# Patient Record
Sex: FEMALE | Race: WHITE | NOT HISPANIC OR LATINO | URBAN - METROPOLITAN AREA
[De-identification: names, ages, dates, MRNs, and addresses within clinical notes are randomized per-mention and may not be internally consistent; named-entity substitution may affect disease eponyms.]

---

## 2017-10-19 ENCOUNTER — EMERGENCY (EMERGENCY)
Facility: HOSPITAL | Age: 57
LOS: 1 days | Discharge: HOME | End: 2017-10-19
Attending: EMERGENCY MEDICINE | Admitting: EMERGENCY MEDICINE
Payer: COMMERCIAL

## 2017-10-19 VITALS
SYSTOLIC BLOOD PRESSURE: 146 MMHG | OXYGEN SATURATION: 100 % | RESPIRATION RATE: 16 BRPM | TEMPERATURE: 98 F | HEART RATE: 89 BPM | DIASTOLIC BLOOD PRESSURE: 78 MMHG

## 2017-10-19 LAB
ALBUMIN SERPL ELPH-MCNC: 3.8 G/DL — SIGNIFICANT CHANGE UP (ref 3.3–5)
ALP SERPL-CCNC: 92 U/L — SIGNIFICANT CHANGE UP (ref 40–120)
ALT FLD-CCNC: 21 U/L — SIGNIFICANT CHANGE UP (ref 4–33)
AST SERPL-CCNC: 22 U/L — SIGNIFICANT CHANGE UP (ref 4–32)
BILIRUB SERPL-MCNC: 0.6 MG/DL — SIGNIFICANT CHANGE UP (ref 0.2–1.2)
BUN SERPL-MCNC: 26 MG/DL — HIGH (ref 7–23)
CALCIUM SERPL-MCNC: 8.9 MG/DL — SIGNIFICANT CHANGE UP (ref 8.4–10.5)
CHLORIDE SERPL-SCNC: 101 MMOL/L — SIGNIFICANT CHANGE UP (ref 98–107)
CO2 SERPL-SCNC: 24 MMOL/L — SIGNIFICANT CHANGE UP (ref 22–31)
CREAT SERPL-MCNC: 0.88 MG/DL — SIGNIFICANT CHANGE UP (ref 0.5–1.3)
ERYTHROCYTE [SEDIMENTATION RATE] IN BLOOD: 18 MM/HR — SIGNIFICANT CHANGE UP (ref 4–25)
GLUCOSE SERPL-MCNC: 98 MG/DL — SIGNIFICANT CHANGE UP (ref 70–99)
HCT VFR BLD CALC: 32 % — LOW (ref 34.5–45)
HGB BLD-MCNC: 9.4 G/DL — LOW (ref 11.5–15.5)
MCHC RBC-ENTMCNC: 24.7 PG — LOW (ref 27–34)
MCHC RBC-ENTMCNC: 29.4 % — LOW (ref 32–36)
MCV RBC AUTO: 84.2 FL — SIGNIFICANT CHANGE UP (ref 80–100)
NRBC # FLD: 0 — SIGNIFICANT CHANGE UP
PLATELET # BLD AUTO: 203 K/UL — SIGNIFICANT CHANGE UP (ref 150–400)
PMV BLD: 10.1 FL — SIGNIFICANT CHANGE UP (ref 7–13)
POTASSIUM SERPL-MCNC: 4.4 MMOL/L — SIGNIFICANT CHANGE UP (ref 3.5–5.3)
POTASSIUM SERPL-SCNC: 4.4 MMOL/L — SIGNIFICANT CHANGE UP (ref 3.5–5.3)
PROT SERPL-MCNC: 6.8 G/DL — SIGNIFICANT CHANGE UP (ref 6–8.3)
RBC # BLD: 3.8 M/UL — SIGNIFICANT CHANGE UP (ref 3.8–5.2)
RBC # FLD: 17.2 % — HIGH (ref 10.3–14.5)
SODIUM SERPL-SCNC: 137 MMOL/L — SIGNIFICANT CHANGE UP (ref 135–145)
WBC # BLD: 7.11 K/UL — SIGNIFICANT CHANGE UP (ref 3.8–10.5)
WBC # FLD AUTO: 7.11 K/UL — SIGNIFICANT CHANGE UP (ref 3.8–10.5)

## 2017-10-19 PROCEDURE — 73600 X-RAY EXAM OF ANKLE: CPT | Mod: 26,76,RT

## 2017-10-19 PROCEDURE — 99284 EMERGENCY DEPT VISIT MOD MDM: CPT

## 2017-10-19 NOTE — ED PROVIDER NOTE - CARE PLAN
Principal Discharge DX:	Venous stasis ulcer  Instructions for follow-up, activity and diet:	The patient was given verbal and written discharge instructions. Specifically, instructions when to return to the ED and when to seek follow-up from their pcp was discussed. Any specialty follow-up was discussed, including how to make an appointment.  Instructions were discussed in simple, plain language and was understood by the patient. The patient understands that should their symptoms worsen or any new symptoms arise, they should return to the ED immediately for further evaluation.

## 2017-10-19 NOTE — CONSULT NOTE ADULT - SUBJECTIVE AND OBJECTIVE BOX
HPI: 57 yo obese woman with PMH of afib on xarelo, presenting with bilateral ankle venous stasis wounds with bleeding today. The patient reports that she was standing at work when she noticed a pool of blood that had soaked through her compression stockings. She attempted to control the bleeding with multiple bandages, but she rebleed as soon as she stood up again.     She states that she has had these wounds for at least six months. She has worn compression stockings regularly since approximately 2010, when she was immobilized for a prolonged period after breaking her foot. She was evaluated "years ago" for venous thrombi, but was determined to not have any VTE. She reports that she follows with a podiatrist, but does not see a vascular surgeon as an outpatient. She has been using topical clindamycin and erythromycin on these wounds.     PMHx: Afib on xarelto, Prior TIA, approximately 5 years ago.     PSHx: See above      Allergies: No Known Allergies  (Intolerances: aspirin (Other (Mild to Mod))  )  Social Hx: Works as a .     Physical Exam  T(C): 36.4 (10-19-17 @ 14:35)  HR: 89 (10-19-17 @ 14:35) (89 - 89)  BP: 146/78 (10-19-17 @ 14:35) (146/78 - 146/78)  RR: 16 (10-19-17 @ 14:35) (16 - 16)  SpO2: 100% (10-19-17 @ 14:35) (100% - 100%)  Tmax: T(C): , Max: 36.4 (10-19-17 @ 14:35)    General: pleasant, obese woman in NAD, AOx3.   Ext: Bilateral deep venous stasis wounds on LEFT medial ankle and RIGHT lateral ankle. Tender erythma around wounds. Wounds hemostatic. Bilateral varicose veins and chronic skin changes. RIGHT DP/ PT. LEFT DP palp.     Labs:                        9.4    7.11  )-----------( 203      ( 19 Oct 2017 15:28 )             32.0       10-19    137  |  101  |  26<H>  ----------------------------<  98  4.4   |  24  |  0.88    Ca    8.9      19 Oct 2017 15:28    TPro  6.8  /  Alb  3.8  /  TBili  0.6  /  DBili  x   /  AST  22  /  ALT  21  /  AlkPhos  92  10-19

## 2017-10-19 NOTE — ED ADULT NURSE NOTE - OBJECTIVE STATEMENT
pt received to intake #9 with c/o bleeding from ulcers. pt with chronic PVD ulcers to b/l ankles. states she takes Xarelto for treatment of a-fib. denies cp, sob, n/v/d. IV placed, labs drawn and sent. no new orders at this time. will cont to monitor.

## 2017-10-19 NOTE — ED PROVIDER NOTE - PSH
Ankle fracture, left  s/p Surgery--2009--[facility]  Foot fracture, left  s/p Surgery --2009--[facility]

## 2017-10-19 NOTE — ED ADULT TRIAGE NOTE - CHIEF COMPLAINT QUOTE
Pt c/o bilateral lower extremity bleeding ulcers x 2 hours.  denies any fevers, chills, nausea, vomiting. pain. on gentamycin, zithromax, and cipro. ulcers wrapped by pt.

## 2017-10-19 NOTE — ED PROVIDER NOTE - PMH
HLD (hyperlipidemia)  Not on meds  HTN - Hypertension  borderline, not on meds  PUD (peptic ulcer disease)  not on PPI

## 2017-10-19 NOTE — ED PROVIDER NOTE - OBJECTIVE STATEMENT
56yF hx afib (on xarelto), htn, bl venous stasis ulcers p/w bleeding from ulcers today. pt follows with Podiatry. Started a silver cream recently. Today at work ulcers started bleeding and formed puddles of blood on ground. Pt attempted to control bleeding with multiple acewraps and bandages. Presented to ED 2/2 inability to control bleeding. No increase in pain. No fever.

## 2017-10-19 NOTE — ED PROVIDER NOTE - ATTENDING CONTRIBUTION TO CARE
DR Bloch- Patient examined hx taken, bleeding from bilateral CVS ulcers on ankles.  Morbidly obese female, Hx of afib on xarelto, has been treated with antibiotics and wounds care but getting worse. Bleeding under control here, right ankle with  stage 3 ulcer on post lateral ankle, Pulses distal present. left leg with 2x2 cm ulcer on medial are above med maleolus, with yellow exudate.  Lungs clear. abd soft nontender.

## 2017-10-19 NOTE — CONSULT NOTE ADULT - ASSESSMENT
55 yo woman with chronic venous ulcers 2/2 to chronic VTE or venous insufficiency.    - Hemorrhage from superficial veins, now controlled  - No evidence of ulcer suprainfection   - Compression stockings, ACE bandage, elevation   - Bilateral lower extremity ultrasound to evaluate for venous insufficiency, saphenofemoral reflux and VTE  - May use collagenase for chemical debridement of wound base.   - Outpatient follow up with Dr. Chang (Office tel: [phone])  - Patient seen and evaluated with Vascular Fellow, Dr. Xiao   - Discussed with Dr. Charlene Carvajal MD PGY2  Vascular surgery: [id number]

## 2017-10-19 NOTE — ED PROVIDER NOTE - PROGRESS NOTE DETAILS
Corin: vascular saw pt. They recommend followup with Dr. Chang at [address] [place] [address]. She can call [phone] to make an appointment.

## 2017-11-01 ENCOUNTER — APPOINTMENT (OUTPATIENT)
Dept: VASCULAR SURGERY | Facility: CLINIC | Age: 57
End: 2017-11-01
Payer: COMMERCIAL

## 2017-11-01 VITALS
SYSTOLIC BLOOD PRESSURE: 137 MMHG | HEIGHT: 70 IN | HEART RATE: 97 BPM | BODY MASS INDEX: 41.95 KG/M2 | WEIGHT: 293 LBS | DIASTOLIC BLOOD PRESSURE: 85 MMHG | TEMPERATURE: 98.2 F

## 2017-11-01 DIAGNOSIS — Z87.19 PERSONAL HISTORY OF OTHER DISEASES OF THE DIGESTIVE SYSTEM: ICD-10-CM

## 2017-11-01 DIAGNOSIS — Z86.73 PERSONAL HISTORY OF TRANSIENT ISCHEMIC ATTACK (TIA), AND CEREBRAL INFARCTION W/OUT RESIDUAL DEFICITS: ICD-10-CM

## 2017-11-01 DIAGNOSIS — Z86.79 PERSONAL HISTORY OF OTHER DISEASES OF THE CIRCULATORY SYSTEM: ICD-10-CM

## 2017-11-01 DIAGNOSIS — R20.8 OTHER DISTURBANCES OF SKIN SENSATION: ICD-10-CM

## 2017-11-01 PROCEDURE — 93970 EXTREMITY STUDY: CPT

## 2017-11-01 PROCEDURE — 99204 OFFICE O/P NEW MOD 45 MIN: CPT | Mod: 25

## 2017-11-01 PROCEDURE — 29580 STRAPPING UNNA BOOT: CPT | Mod: 50

## 2017-11-01 RX ORDER — RIVAROXABAN 2.5 MG/1
TABLET, FILM COATED ORAL
Refills: 0 | Status: ACTIVE | COMMUNITY

## 2017-11-08 ENCOUNTER — APPOINTMENT (OUTPATIENT)
Dept: VASCULAR SURGERY | Facility: CLINIC | Age: 57
End: 2017-11-08
Payer: COMMERCIAL

## 2017-11-08 VITALS
TEMPERATURE: 98 F | WEIGHT: 293 LBS | DIASTOLIC BLOOD PRESSURE: 88 MMHG | BODY MASS INDEX: 41.95 KG/M2 | HEART RATE: 93 BPM | SYSTOLIC BLOOD PRESSURE: 136 MMHG | HEIGHT: 70 IN

## 2017-11-08 PROCEDURE — 29580 STRAPPING UNNA BOOT: CPT | Mod: 50

## 2017-11-08 PROCEDURE — 99213 OFFICE O/P EST LOW 20 MIN: CPT | Mod: 25

## 2017-11-10 ENCOUNTER — APPOINTMENT (OUTPATIENT)
Dept: VASCULAR SURGERY | Facility: CLINIC | Age: 57
End: 2017-11-10
Payer: COMMERCIAL

## 2017-11-10 PROCEDURE — 99213 OFFICE O/P EST LOW 20 MIN: CPT

## 2017-11-13 ENCOUNTER — APPOINTMENT (OUTPATIENT)
Dept: VASCULAR SURGERY | Facility: CLINIC | Age: 57
End: 2017-11-13
Payer: COMMERCIAL

## 2017-11-13 VITALS
DIASTOLIC BLOOD PRESSURE: 81 MMHG | HEIGHT: 70 IN | SYSTOLIC BLOOD PRESSURE: 136 MMHG | TEMPERATURE: 98 F | HEART RATE: 105 BPM | WEIGHT: 293 LBS | BODY MASS INDEX: 41.95 KG/M2

## 2017-11-13 PROCEDURE — 29580 STRAPPING UNNA BOOT: CPT | Mod: LT

## 2017-11-15 ENCOUNTER — APPOINTMENT (OUTPATIENT)
Dept: VASCULAR SURGERY | Facility: CLINIC | Age: 57
End: 2017-11-15
Payer: COMMERCIAL

## 2017-11-15 VITALS
DIASTOLIC BLOOD PRESSURE: 82 MMHG | SYSTOLIC BLOOD PRESSURE: 135 MMHG | BODY MASS INDEX: 41.95 KG/M2 | WEIGHT: 293 LBS | HEIGHT: 70 IN | TEMPERATURE: 97.4 F | HEART RATE: 103 BPM

## 2017-11-15 PROCEDURE — 99213 OFFICE O/P EST LOW 20 MIN: CPT

## 2017-11-17 ENCOUNTER — APPOINTMENT (OUTPATIENT)
Dept: VASCULAR SURGERY | Facility: CLINIC | Age: 57
End: 2017-11-17
Payer: COMMERCIAL

## 2017-11-17 VITALS
BODY MASS INDEX: 41.95 KG/M2 | DIASTOLIC BLOOD PRESSURE: 85 MMHG | HEIGHT: 70 IN | SYSTOLIC BLOOD PRESSURE: 123 MMHG | WEIGHT: 293 LBS | HEART RATE: 99 BPM | TEMPERATURE: 97.5 F

## 2017-11-17 PROCEDURE — 29580 STRAPPING UNNA BOOT: CPT | Mod: LT

## 2017-11-17 RX ORDER — METOPROLOL SUCCINATE 100 MG/1
100 TABLET, EXTENDED RELEASE ORAL
Qty: 90 | Refills: 0 | Status: ACTIVE | COMMUNITY
Start: 2017-11-15

## 2017-11-17 RX ORDER — ATORVASTATIN CALCIUM 40 MG/1
40 TABLET, FILM COATED ORAL
Qty: 90 | Refills: 0 | Status: ACTIVE | COMMUNITY
Start: 2017-11-15

## 2017-11-20 ENCOUNTER — APPOINTMENT (OUTPATIENT)
Dept: VASCULAR SURGERY | Facility: CLINIC | Age: 57
End: 2017-11-20
Payer: COMMERCIAL

## 2017-11-20 VITALS
TEMPERATURE: 98.2 F | DIASTOLIC BLOOD PRESSURE: 94 MMHG | WEIGHT: 293 LBS | HEIGHT: 70 IN | RESPIRATION RATE: 12 BRPM | SYSTOLIC BLOOD PRESSURE: 140 MMHG | BODY MASS INDEX: 41.95 KG/M2 | HEART RATE: 84 BPM

## 2017-11-20 PROCEDURE — 29580 STRAPPING UNNA BOOT: CPT | Mod: LT

## 2017-11-22 ENCOUNTER — APPOINTMENT (OUTPATIENT)
Dept: VASCULAR SURGERY | Facility: CLINIC | Age: 57
End: 2017-11-22
Payer: COMMERCIAL

## 2017-11-22 VITALS
HEART RATE: 98 BPM | SYSTOLIC BLOOD PRESSURE: 121 MMHG | DIASTOLIC BLOOD PRESSURE: 75 MMHG | WEIGHT: 293 LBS | HEIGHT: 70 IN | BODY MASS INDEX: 41.95 KG/M2 | TEMPERATURE: 97.7 F

## 2017-11-22 PROCEDURE — 99212 OFFICE O/P EST SF 10 MIN: CPT

## 2017-11-27 ENCOUNTER — APPOINTMENT (OUTPATIENT)
Dept: VASCULAR SURGERY | Facility: CLINIC | Age: 57
End: 2017-11-27
Payer: COMMERCIAL

## 2017-11-27 VITALS
SYSTOLIC BLOOD PRESSURE: 126 MMHG | TEMPERATURE: 98.3 F | BODY MASS INDEX: 41.95 KG/M2 | HEIGHT: 70 IN | HEART RATE: 96 BPM | DIASTOLIC BLOOD PRESSURE: 90 MMHG | WEIGHT: 293 LBS

## 2017-11-27 PROCEDURE — 29580 STRAPPING UNNA BOOT: CPT | Mod: LT

## 2017-11-29 ENCOUNTER — APPOINTMENT (OUTPATIENT)
Dept: VASCULAR SURGERY | Facility: CLINIC | Age: 57
End: 2017-11-29

## 2017-11-30 ENCOUNTER — APPOINTMENT (OUTPATIENT)
Dept: VASCULAR SURGERY | Facility: CLINIC | Age: 57
End: 2017-11-30
Payer: COMMERCIAL

## 2017-11-30 VITALS
SYSTOLIC BLOOD PRESSURE: 124 MMHG | HEART RATE: 77 BPM | TEMPERATURE: 97.6 F | DIASTOLIC BLOOD PRESSURE: 83 MMHG | BODY MASS INDEX: 41.95 KG/M2 | WEIGHT: 293 LBS | HEIGHT: 70 IN

## 2017-11-30 DIAGNOSIS — I83.819 VARICOSE VEINS OF UNSPECIFIED LOWER EXTREMITY WITH PAIN: ICD-10-CM

## 2017-11-30 PROCEDURE — 29580 STRAPPING UNNA BOOT: CPT | Mod: 50

## 2017-12-04 ENCOUNTER — APPOINTMENT (OUTPATIENT)
Dept: VASCULAR SURGERY | Facility: CLINIC | Age: 57
End: 2017-12-04
Payer: COMMERCIAL

## 2017-12-04 VITALS
SYSTOLIC BLOOD PRESSURE: 116 MMHG | TEMPERATURE: 98 F | DIASTOLIC BLOOD PRESSURE: 74 MMHG | HEART RATE: 97 BPM | HEIGHT: 70 IN | WEIGHT: 293 LBS | BODY MASS INDEX: 41.95 KG/M2

## 2017-12-04 PROCEDURE — 29580 STRAPPING UNNA BOOT: CPT | Mod: 50

## 2017-12-08 ENCOUNTER — APPOINTMENT (OUTPATIENT)
Dept: VASCULAR SURGERY | Facility: CLINIC | Age: 57
End: 2017-12-08
Payer: COMMERCIAL

## 2017-12-08 VITALS
BODY MASS INDEX: 41.95 KG/M2 | SYSTOLIC BLOOD PRESSURE: 138 MMHG | HEIGHT: 70 IN | TEMPERATURE: 98.1 F | WEIGHT: 293 LBS | DIASTOLIC BLOOD PRESSURE: 84 MMHG | HEART RATE: 89 BPM

## 2017-12-08 PROCEDURE — 29580 STRAPPING UNNA BOOT: CPT | Mod: 50

## 2017-12-11 ENCOUNTER — APPOINTMENT (OUTPATIENT)
Dept: VASCULAR SURGERY | Facility: CLINIC | Age: 57
End: 2017-12-11
Payer: COMMERCIAL

## 2017-12-11 VITALS
DIASTOLIC BLOOD PRESSURE: 87 MMHG | BODY MASS INDEX: 41.95 KG/M2 | HEART RATE: 86 BPM | TEMPERATURE: 97.5 F | SYSTOLIC BLOOD PRESSURE: 131 MMHG | WEIGHT: 293 LBS | HEIGHT: 70 IN

## 2017-12-11 PROCEDURE — 99214 OFFICE O/P EST MOD 30 MIN: CPT | Mod: 25

## 2017-12-11 PROCEDURE — 29580 STRAPPING UNNA BOOT: CPT | Mod: 50

## 2017-12-14 ENCOUNTER — APPOINTMENT (OUTPATIENT)
Dept: VASCULAR SURGERY | Facility: CLINIC | Age: 57
End: 2017-12-14
Payer: COMMERCIAL

## 2017-12-14 VITALS
DIASTOLIC BLOOD PRESSURE: 84 MMHG | HEART RATE: 93 BPM | SYSTOLIC BLOOD PRESSURE: 149 MMHG | HEIGHT: 70 IN | BODY MASS INDEX: 41.95 KG/M2 | WEIGHT: 293 LBS | TEMPERATURE: 97.7 F

## 2017-12-14 PROCEDURE — 29580 STRAPPING UNNA BOOT: CPT | Mod: 50

## 2017-12-18 ENCOUNTER — APPOINTMENT (OUTPATIENT)
Dept: VASCULAR SURGERY | Facility: CLINIC | Age: 57
End: 2017-12-18
Payer: COMMERCIAL

## 2017-12-18 VITALS
DIASTOLIC BLOOD PRESSURE: 79 MMHG | TEMPERATURE: 97.8 F | HEART RATE: 102 BPM | SYSTOLIC BLOOD PRESSURE: 126 MMHG | HEIGHT: 70 IN | BODY MASS INDEX: 41.95 KG/M2 | WEIGHT: 293 LBS

## 2017-12-18 PROCEDURE — 29580 STRAPPING UNNA BOOT: CPT | Mod: 50

## 2017-12-21 ENCOUNTER — APPOINTMENT (OUTPATIENT)
Dept: VASCULAR SURGERY | Facility: CLINIC | Age: 57
End: 2017-12-21
Payer: COMMERCIAL

## 2017-12-21 VITALS
WEIGHT: 293 LBS | SYSTOLIC BLOOD PRESSURE: 137 MMHG | TEMPERATURE: 97.6 F | HEART RATE: 99 BPM | BODY MASS INDEX: 41.95 KG/M2 | DIASTOLIC BLOOD PRESSURE: 79 MMHG | HEIGHT: 70 IN

## 2017-12-21 PROCEDURE — 29580 STRAPPING UNNA BOOT: CPT | Mod: 50

## 2017-12-29 ENCOUNTER — APPOINTMENT (OUTPATIENT)
Dept: VASCULAR SURGERY | Facility: CLINIC | Age: 57
End: 2017-12-29
Payer: COMMERCIAL

## 2017-12-29 PROCEDURE — 36475 ENDOVENOUS RF 1ST VEIN: CPT | Mod: RT

## 2018-01-02 ENCOUNTER — APPOINTMENT (OUTPATIENT)
Dept: VASCULAR SURGERY | Facility: CLINIC | Age: 58
End: 2018-01-02
Payer: COMMERCIAL

## 2018-01-02 PROCEDURE — 93971 EXTREMITY STUDY: CPT

## 2018-01-09 ENCOUNTER — MEDICATION RENEWAL (OUTPATIENT)
Age: 58
End: 2018-01-09

## 2018-01-12 ENCOUNTER — APPOINTMENT (OUTPATIENT)
Dept: VASCULAR SURGERY | Facility: CLINIC | Age: 58
End: 2018-01-12
Payer: COMMERCIAL

## 2018-01-12 VITALS
TEMPERATURE: 98 F | HEART RATE: 91 BPM | DIASTOLIC BLOOD PRESSURE: 73 MMHG | BODY MASS INDEX: 41.95 KG/M2 | WEIGHT: 293 LBS | SYSTOLIC BLOOD PRESSURE: 116 MMHG | HEIGHT: 70 IN

## 2018-01-12 DIAGNOSIS — I83.899 VARICOSE VEINS OF UNSPECIFIED LOWER EXTREMITY WITH OTHER COMPLICATIONS: ICD-10-CM

## 2018-01-12 PROCEDURE — 99213 OFFICE O/P EST LOW 20 MIN: CPT

## 2018-01-12 RX ORDER — AMOXICILLIN AND CLAVULANATE POTASSIUM 875; 125 MG/1; MG/1
875-125 TABLET, COATED ORAL
Qty: 20 | Refills: 0 | Status: DISCONTINUED | COMMUNITY
Start: 2017-07-11 | End: 2018-01-12

## 2018-01-19 ENCOUNTER — APPOINTMENT (OUTPATIENT)
Dept: VASCULAR SURGERY | Facility: CLINIC | Age: 58
End: 2018-01-19
Payer: COMMERCIAL

## 2018-01-19 PROCEDURE — 36475 ENDOVENOUS RF 1ST VEIN: CPT | Mod: LT

## 2018-01-22 ENCOUNTER — APPOINTMENT (OUTPATIENT)
Dept: VASCULAR SURGERY | Facility: CLINIC | Age: 58
End: 2018-01-22
Payer: COMMERCIAL

## 2018-01-22 PROCEDURE — 93971 EXTREMITY STUDY: CPT

## 2018-02-07 ENCOUNTER — RX RENEWAL (OUTPATIENT)
Age: 58
End: 2018-02-07

## 2018-02-16 ENCOUNTER — APPOINTMENT (OUTPATIENT)
Dept: VASCULAR SURGERY | Facility: CLINIC | Age: 58
End: 2018-02-16
Payer: COMMERCIAL

## 2018-02-16 PROCEDURE — 36475 ENDOVENOUS RF 1ST VEIN: CPT | Mod: RT

## 2018-02-20 ENCOUNTER — APPOINTMENT (OUTPATIENT)
Dept: VASCULAR SURGERY | Facility: CLINIC | Age: 58
End: 2018-02-20
Payer: COMMERCIAL

## 2018-02-20 PROCEDURE — 93971 EXTREMITY STUDY: CPT

## 2018-03-28 ENCOUNTER — APPOINTMENT (OUTPATIENT)
Dept: VASCULAR SURGERY | Facility: CLINIC | Age: 58
End: 2018-03-28

## 2019-11-04 ENCOUNTER — OUTPATIENT (OUTPATIENT)
Dept: OUTPATIENT SERVICES | Facility: HOSPITAL | Age: 59
LOS: 1 days | Discharge: HOME | End: 2019-11-04
Payer: COMMERCIAL

## 2019-11-04 VITALS
SYSTOLIC BLOOD PRESSURE: 126 MMHG | HEART RATE: 73 BPM | WEIGHT: 293 LBS | OXYGEN SATURATION: 97 % | HEIGHT: 70 IN | DIASTOLIC BLOOD PRESSURE: 96 MMHG | RESPIRATION RATE: 17 BRPM | TEMPERATURE: 97 F

## 2019-11-04 DIAGNOSIS — M67.472 GANGLION, LEFT ANKLE AND FOOT: ICD-10-CM

## 2019-11-04 DIAGNOSIS — T84.84XA PAIN DUE TO INTERNAL ORTHOPEDIC PROSTHETIC DEVICES, IMPLANTS AND GRAFTS, INITIAL ENCOUNTER: ICD-10-CM

## 2019-11-04 DIAGNOSIS — E66.9 OBESITY, UNSPECIFIED: ICD-10-CM

## 2019-11-04 DIAGNOSIS — I48.91 UNSPECIFIED ATRIAL FIBRILLATION: ICD-10-CM

## 2019-11-04 DIAGNOSIS — Z01.818 ENCOUNTER FOR OTHER PREPROCEDURAL EXAMINATION: ICD-10-CM

## 2019-11-04 DIAGNOSIS — I10 ESSENTIAL (PRIMARY) HYPERTENSION: ICD-10-CM

## 2019-11-04 DIAGNOSIS — E78.5 HYPERLIPIDEMIA, UNSPECIFIED: ICD-10-CM

## 2019-11-04 LAB
ANION GAP SERPL CALC-SCNC: 6 MMOL/L — SIGNIFICANT CHANGE UP (ref 5–17)
APTT BLD: 44.6 SEC — HIGH (ref 28.5–37)
BASOPHILS # BLD AUTO: 0.03 K/UL — SIGNIFICANT CHANGE UP (ref 0–0.2)
BASOPHILS NFR BLD AUTO: 0.6 % — SIGNIFICANT CHANGE UP (ref 0–2)
BUN SERPL-MCNC: 28 MG/DL — HIGH (ref 7–23)
CALCIUM SERPL-MCNC: 9.3 MG/DL — SIGNIFICANT CHANGE UP (ref 8.5–10.1)
CHLORIDE SERPL-SCNC: 109 MMOL/L — HIGH (ref 96–108)
CO2 SERPL-SCNC: 29 MMOL/L — SIGNIFICANT CHANGE UP (ref 22–31)
CREAT SERPL-MCNC: 0.78 MG/DL — SIGNIFICANT CHANGE UP (ref 0.5–1.3)
EOSINOPHIL # BLD AUTO: 0.11 K/UL — SIGNIFICANT CHANGE UP (ref 0–0.5)
EOSINOPHIL NFR BLD AUTO: 2.2 % — SIGNIFICANT CHANGE UP (ref 0–6)
GLUCOSE SERPL-MCNC: 94 MG/DL — SIGNIFICANT CHANGE UP (ref 70–99)
HCT VFR BLD CALC: 39.8 % — SIGNIFICANT CHANGE UP (ref 34.5–45)
HGB BLD-MCNC: 12.8 G/DL — SIGNIFICANT CHANGE UP (ref 11.5–15.5)
IMM GRANULOCYTES NFR BLD AUTO: 0.6 % — SIGNIFICANT CHANGE UP (ref 0–1.5)
INR BLD: 1.85 RATIO — HIGH (ref 0.88–1.16)
LYMPHOCYTES # BLD AUTO: 1 K/UL — SIGNIFICANT CHANGE UP (ref 1–3.3)
LYMPHOCYTES # BLD AUTO: 20 % — SIGNIFICANT CHANGE UP (ref 13–44)
MCHC RBC-ENTMCNC: 30.5 PG — SIGNIFICANT CHANGE UP (ref 27–34)
MCHC RBC-ENTMCNC: 32.2 GM/DL — SIGNIFICANT CHANGE UP (ref 32–36)
MCV RBC AUTO: 94.8 FL — SIGNIFICANT CHANGE UP (ref 80–100)
MONOCYTES # BLD AUTO: 0.37 K/UL — SIGNIFICANT CHANGE UP (ref 0–0.9)
MONOCYTES NFR BLD AUTO: 7.4 % — SIGNIFICANT CHANGE UP (ref 2–14)
NEUTROPHILS # BLD AUTO: 3.47 K/UL — SIGNIFICANT CHANGE UP (ref 1.8–7.4)
NEUTROPHILS NFR BLD AUTO: 69.2 % — SIGNIFICANT CHANGE UP (ref 43–77)
NRBC # BLD: 0 /100 WBCS — SIGNIFICANT CHANGE UP (ref 0–0)
PLATELET # BLD AUTO: 170 K/UL — SIGNIFICANT CHANGE UP (ref 150–400)
POTASSIUM SERPL-MCNC: 4.5 MMOL/L — SIGNIFICANT CHANGE UP (ref 3.5–5.3)
POTASSIUM SERPL-SCNC: 4.5 MMOL/L — SIGNIFICANT CHANGE UP (ref 3.5–5.3)
PROTHROM AB SERPL-ACNC: 21.1 SEC — HIGH (ref 10–12.9)
RBC # BLD: 4.2 M/UL — SIGNIFICANT CHANGE UP (ref 3.8–5.2)
RBC # FLD: 12.6 % — SIGNIFICANT CHANGE UP (ref 10.3–14.5)
SODIUM SERPL-SCNC: 144 MMOL/L — SIGNIFICANT CHANGE UP (ref 135–145)
WBC # BLD: 5.01 K/UL — SIGNIFICANT CHANGE UP (ref 3.8–10.5)
WBC # FLD AUTO: 5.01 K/UL — SIGNIFICANT CHANGE UP (ref 3.8–10.5)

## 2019-11-04 PROCEDURE — 93010 ELECTROCARDIOGRAM REPORT: CPT

## 2019-11-04 NOTE — H&P PST ADULT - ASSESSMENT
59F pmh afib (on Xarelto), htn, hl, anemia, obesity, TIA c/o left foot discomfort 2/2 internal orthopedic prosthetic devices and ganglion here for PST for scheduled Left foot excision of hardware and excision of cyst  CAPRINI SCORE    AGE RELATED RISK FACTORS                                                       MOBILITY RELATED FACTORS  [ x Age 41-60 years                                            (1 Point)                  [ ] Bed rest                                                        (1 Point)  [ ] Age: 61-74 years                                           (2 Points)                [ ] Plaster cast                                                   (2 Points)  [ ] Age= 75 years                                              (3 Points)                 [ ] Bed bound for more than 72 hours                   (2 Points)    DISEASE RELATED RISK FACTORS                                               GENDER SPECIFIC FACTORS  [x ] Edema in the lower extremities                       (1 Point)                  [ ] Pregnancy                                                     (1 Point)  [ ] Varicose veins                                               (1 Point)                  [ ] Post-partum < 6 weeks                                   (1 Point)             [x ] BMI > 25 Kg/m2                                            (1 Point)                  [ ] Hormonal therapy  or oral contraception            (1 Point)                 [ ] Sepsis (in the previous month)                        (1 Point)                  [ ] History of pregnancy complications  [ ] Pneumonia or serious lung disease                                               [ ] Unexplained or recurrent                       (1 Point)           (in the previous month)                               (1 Point)  [ ] Abnormal pulmonary function test                     (1 Point)                 SURGERY RELATED RISK FACTORS  [ ] Acute myocardial infarction                              (1 Point)                 [ ]  Section                                            (1 Point)  [ ] Congestive heart failure (in the previous month)  (1 Point)                 [ ] Minor surgery                                                 (1 Point)   [ ] Inflammatory bowel disease                             (1 Point)                 [ ] Arthroscopic surgery                                        (2 Points)  [ ] Central venous access                                    (2 Points)                [ x] General surgery lasting more than 45 minutes   (2 Points)       [ ] Stroke (in the previous month)                          (5 Points)               [ ] Elective arthroplasty                                        (5 Points)                                                                                                                                               HEMATOLOGY RELATED FACTORS                                                 TRAUMA RELATED RISK FACTORS  [ ] Prior episodes of VTE                                     (3 Points)                 [ ] Fracture of the hip, pelvis, or leg                       (5 Points)  [ ] Positive family history for VTE                         (3 Points)                 [ ] Acute spinal cord injury (in the previous month)  (5 Points)  [ ] Prothrombin 66817 A                                      (3 Points)                 [ ] Paralysis  (less than 1 month)                          (5 Points)  [ ] Factor V Leiden                                             (3 Points)                 [ ] Multiple Trauma within 1 month                         (5 Points)  [ ] Lupus anticoagulants                                     (3 Points)                                                           [ ] Anticardiolipin antibodies                                (3 Points)                                                       [ ] High homocysteine in the blood                      (3 Points)                                             [ ] Other congenital or acquired thrombophilia       (3 Points)                                                [ ] Heparin induced thrombocytopenia                  (3 Points)                                          Total Score [      5    ]

## 2019-11-04 NOTE — H&P PST ADULT - HISTORY OF PRESENT ILLNESS
59F pmh afib (on Xarelto), htn, hl, anemia, obesity, TIA c/o left foot discomfort 2/2 internal orthopedic prosthetic devices and ganglion here for PST for scheduled Left foot excision of hardware and excision of cyst

## 2019-11-04 NOTE — H&P PST ADULT - NSICDXPASTMEDICALHX_GEN_ALL_CORE_FT
PAST MEDICAL HISTORY:  Afib On Xarelto    HLD (hyperlipidemia) Not on meds    HTN - Hypertension borderline, not on meds    PUD (peptic ulcer disease) not on PPI PAST MEDICAL HISTORY:  Afib On Xarelto    HLD (hyperlipidemia) Not on meds    HTN - Hypertension borderline, not on meds    Obesity     PUD (peptic ulcer disease) not on PPI    TIA (transient ischemic attack)

## 2019-11-04 NOTE — H&P PST ADULT - NSICDXPASTSURGICALHX_GEN_ALL_CORE_FT
PAST SURGICAL HISTORY:  Ankle fracture, left s/p Surgery--2009--[email or web address]    Foot fracture, left s/p Surgery --2009--[email or web address] PAST SURGICAL HISTORY:  Ankle fracture, left s/p Surgery--2009--[facility]    Foot fracture, left s/p Surgery --2009--[facility]

## 2019-11-04 NOTE — H&P PST ADULT - NSICDXPROBLEM_GEN_ALL_CORE_FT
PROBLEM DIAGNOSES  Problem: Pain due to internal orthopedic prosthetic devices, implants and grafts, initial encounter  Assessment and Plan: Left foot excision of hardware and excision of cyst  Pre-op instructions given  patient verbalized understanding  Chlorhexidine wash instructions given   Pending: Cardiac clearance    Problem: Ganglion, left ankle and foot  Assessment and Plan: Left foot excisionof fyipdy6gm and excision of cyst    Problem: Afib  Assessment and Plan: On Xarelto  Continue current regimen and medications.   Pending: Cardiac clearance    Problem: HLD (hyperlipidemia)  Assessment and Plan: Continue current regimen and medications.     Problem: HTN (hypertension)  Assessment and Plan: Continue current regimen and medications.     Problem: Obesity  Assessment and Plan: CHRISTY precautions

## 2019-11-04 NOTE — H&P PST ADULT - NSANTHOSAYNRD_GEN_A_CORE
No. CHRISTY screening performed.  STOP BANG Legend: 0-2 = LOW Risk; 3-4 = INTERMEDIATE Risk; 5-8 = HIGH Risk

## 2019-11-05 NOTE — PROGRESS NOTE ADULT - SUBJECTIVE AND OBJECTIVE BOX
CHIEF COMPLAINT:Pre op evaluation    HPI:-59F pmh aAtrial Fibrillation (on Xarelto),HTN,HLD,anemia, Obesity, TIA c/o left foot discomfort 2/2 internal orthopedic prosthetic devices and ganglion  scheduled for Left foot excision of hardware and excision of cyst. No recent cardiac events prior ischemic [id number]-no ischemia normal LV systolic function,denies chest pain syncope    PAST MEDICAL & SURGICAL HISTORY:  Obesity  TIA (transient ischemic attack)  Afib: On Xarelto  PUD (peptic ulcer disease): not on PPI  HLD (hyperlipidemia): Not on meds  HTN - Hypertension: borderline, not on meds  Ankle fracture, left: s/p Surgery--2009--[facility]  Foot fracture, left: s/p Surgery --2009--[facility]    MEDICATIONS          Xarelto 20 mg oral tablet: 1 tab(s) orally once a day  · 	metoprolol succinate 100 mg oral capsule, extended release: 1 cap(s) orally once a day  · 	atorvastatin 40 mg oral tablet: 1 tab(s) orally once a day  · 	lisinopril 10 mg oral tablet: 1 tab(s) orally once a day  · 	ferrous sulfate: 1 tab(s) orally once a day  · 	Vitamin D3 2000 intl units oral capsule: 1 cap(s) orally once a day  · 	Advil 200 mg oral tablet: 1 tab(s) orally every 6 hours  FAMILY HISTORY:  No family history of premature coronary artery disease or sudden cardiac death    SOCIAL HISTORY:  Smoking-Non Smoker  Alcohol-Quit  Ilicit Drug use-Denies    REVIEW OF SYSTEMS:  Constitutional: [ ] fever, [ ]weight loss, [ ]fatigue Activity [ ] Bedbound,[ ] Ambulates [ ] Unassisted[ ] Cane/Walker [ ] Assistence.  Eyes: [ ] visual changes  Respiratory: [ ]shortness of breath;  [ ] cough, [ ]wheezing, [ ]chills, [ ]hemoptysis  Cardiovascular: [ ] chest pain, [ ]palpitations, [ ]dizziness,  [ ]leg swelling[ ]orthopnea [ ]PND  Gastrointestinal: [ ] abdominal pain, [ ]nausea, [ ]vomiting,  [ ]diarrhea,[ ]constipation  Genitourinary: [ ] dysuria, [ ] hematuria  Neurologic: [ ] headaches [ ] tremors[ ] weakness  Skin: [ ] itching, [ ]burning, [ ] rashes  Endocrine: [ ] heat or cold intolerance  Musculoskeletal: [ ] joint pain or swelling; [ ] muscle, back, or extremity pain  Psychiatric: [ ] depression, [ ]anxiety, [ ]mood swings, or [ ]difficulty sleeping  Hematologic: [ ] easy bruising, [ ] bleeding gums       [ x] All others negative	  [ ] Unable to obtain        PHYSICAL EXAM:  General: No acute distress BMI-51.9  HEENT: EOMI, PERRL[ ] Icteric  Neck: Supple, No JVD  Lungs: Equal air entry bilaterally; [ ] Rales [ ] Rhonchi [ ] Wheezing  Heart: Regular rate and rhythm;[x ] Murmurs-  2 /6 [x ] Systolic [ ] Diastolic [ ] Radiation,No rubs, or gallops  Abdomen: Nontender, bowel sounds present  Extremities: No clubbing, cyanosis, or edema[ ] Calf tenderness  Nervous system:  Alert & Oriented X3, no focal deficits  Psychiatric: Normal affect  Skin: No rashes or lesions      LABS:  11-04    144  |  109<H>  |  28<H>  ----------------------------<  94  4.5   |  29  |  0.78    Ca    9.3      04 Nov 2019 09:17      Creatinine Trend: 0.78<--                        12.8   5.01  )-----------( 170      ( 04 Nov 2019 09:17 )             39.8     PT/INR - ( 04 Nov 2019 09:17 )   PT: 21.1 sec;   INR: 1.85 ratio         PTT - ( 04 Nov 2019 09:17 )  PTT:44.6 sec      ECG [my interpretation]:Atrial Fibrillation at 73 BPM no acute ST T wave abnormalities

## 2019-11-05 NOTE — PROGRESS NOTE ADULT - ATTENDING COMMENTS
Thank you for the courtesy of the consultation,I would be available for any further discussion if needed.  Tc Keyse MD,FACC.  [address] [place], [place] [id number] [phone]

## 2019-11-05 NOTE — PROGRESS NOTE ADULT - ASSESSMENT
Problem: Pain due to internal orthopedic prosthetic devices, implants and grafts, initial encounter  Assessment and Plan: Left foot excision of hardware and excision of cyst  Pre-op instructions given  patient verbalized understanding  Chlorhexidine wash instructions given   She is at INTERMEDIATE cardiac risk with no cardiac contraindications for procedure.No further cardiac workup is neccessary at this time      Problem: Ganglion, left ankle and foot  Assessment and Plan: Left foot excisionof hardware and excision of cyst    Problem: Atrial Fibrillation  Assessment and Plan: On Xarelto  Continue current regimen and medications.   Hold Xarelto 2 days prior to prcedure and resume post operatively    Problem: HLD (hyperlipidemia)  Assessment and Plan: Continue current regimen and medications.     Problem: HTN (hypertension)  Assessment and Plan: Continue current regimen and medications.     Problem: Obesity  Assessment and Plan: CHRISTY precautions.

## 2019-11-19 ENCOUNTER — TRANSCRIPTION ENCOUNTER (OUTPATIENT)
Age: 59
End: 2019-11-19

## 2019-11-20 ENCOUNTER — OUTPATIENT (OUTPATIENT)
Dept: OUTPATIENT SERVICES | Facility: HOSPITAL | Age: 59
LOS: 1 days | Discharge: HOME | End: 2019-11-20
Payer: COMMERCIAL

## 2019-11-20 ENCOUNTER — RESULT REVIEW (OUTPATIENT)
Age: 59
End: 2019-11-20

## 2019-11-20 VITALS
HEART RATE: 89 BPM | TEMPERATURE: 97 F | DIASTOLIC BLOOD PRESSURE: 65 MMHG | OXYGEN SATURATION: 97 % | RESPIRATION RATE: 15 BRPM | SYSTOLIC BLOOD PRESSURE: 139 MMHG

## 2019-11-20 VITALS
HEIGHT: 70 IN | WEIGHT: 293 LBS | DIASTOLIC BLOOD PRESSURE: 76 MMHG | SYSTOLIC BLOOD PRESSURE: 130 MMHG | RESPIRATION RATE: 18 BRPM | OXYGEN SATURATION: 97 % | HEART RATE: 90 BPM | TEMPERATURE: 99 F

## 2019-11-20 PROCEDURE — 73620 X-RAY EXAM OF FOOT: CPT | Mod: 26,LT

## 2019-11-20 PROCEDURE — 88305 TISSUE EXAM BY PATHOLOGIST: CPT | Mod: 26

## 2019-11-20 PROCEDURE — 88300 SURGICAL PATH GROSS: CPT | Mod: 26,59

## 2019-11-20 RX ORDER — FENTANYL CITRATE 50 UG/ML
25 INJECTION INTRAVENOUS
Refills: 0 | Status: DISCONTINUED | OUTPATIENT
Start: 2019-11-20 | End: 2019-11-20

## 2019-11-20 RX ORDER — FENTANYL CITRATE 50 UG/ML
50 INJECTION INTRAVENOUS
Refills: 0 | Status: DISCONTINUED | OUTPATIENT
Start: 2019-11-20 | End: 2019-11-20

## 2019-11-20 RX ORDER — SODIUM CHLORIDE 9 MG/ML
1000 INJECTION, SOLUTION INTRAVENOUS
Refills: 0 | Status: DISCONTINUED | OUTPATIENT
Start: 2019-11-20 | End: 2019-11-20

## 2019-11-20 RX ORDER — ACETAMINOPHEN 500 MG
1000 TABLET ORAL ONCE
Refills: 0 | Status: DISCONTINUED | OUTPATIENT
Start: 2019-11-20 | End: 2019-11-20

## 2019-11-20 RX ORDER — SODIUM CHLORIDE 9 MG/ML
3 INJECTION INTRAMUSCULAR; INTRAVENOUS; SUBCUTANEOUS EVERY 8 HOURS
Refills: 0 | Status: DISCONTINUED | OUTPATIENT
Start: 2019-11-20 | End: 2019-11-20

## 2019-11-20 RX ADMIN — SODIUM CHLORIDE 100 MILLILITER(S): 9 INJECTION, SOLUTION INTRAVENOUS at 08:44

## 2019-11-20 RX ADMIN — SODIUM CHLORIDE 3 MILLILITER(S): 9 INJECTION INTRAMUSCULAR; INTRAVENOUS; SUBCUTANEOUS at 06:55
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
## 2019-11-20 NOTE — ASU PATIENT PROFILE, ADULT - PSH
Ankle fracture, left  s/p Surgery--2009--[facility]  Foot fracture, left  s/p Surgery --2009--[facility]

## 2019-11-20 NOTE — ASU PATIENT PROFILE, ADULT - PMH
Afib  On Xarelto  HLD (hyperlipidemia)  Not on meds  HTN - Hypertension  borderline, not on meds  Obesity    PUD (peptic ulcer disease)  not on PPI  TIA (transient ischemic attack)

## 2019-11-20 NOTE — ASU DISCHARGE PLAN (ADULT/PEDIATRIC) - CALL YOUR DOCTOR IF YOU HAVE ANY OF THE FOLLOWING:
Fever greater than (need to indicate Fahrenheit or Celsius)/Swelling that gets worse/Numbness, tingling, color or temperature change to extremity/Nausea and vomiting that does not stop/Bleeding that does not stop/Pain not relieved by Medications

## 2019-11-20 NOTE — ASU DISCHARGE PLAN (ADULT/PEDIATRIC) - ASU DC SPECIAL INSTRUCTIONSFT
-keep dressing clean dry and intact   -weight bear as tolerated to the left foot   -follow up with Dr. Obrien in office next week

## 2019-11-21 LAB — SURGICAL PATHOLOGY STUDY: SIGNIFICANT CHANGE UP

## 2019-11-25 DIAGNOSIS — E66.01 MORBID (SEVERE) OBESITY DUE TO EXCESS CALORIES: ICD-10-CM

## 2019-11-25 DIAGNOSIS — Z79.01 LONG TERM (CURRENT) USE OF ANTICOAGULANTS: ICD-10-CM

## 2019-11-25 DIAGNOSIS — Z86.73 PERSONAL HISTORY OF TRANSIENT ISCHEMIC ATTACK (TIA), AND CEREBRAL INFARCTION WITHOUT RESIDUAL DEFICITS: ICD-10-CM

## 2019-11-25 DIAGNOSIS — I10 ESSENTIAL (PRIMARY) HYPERTENSION: ICD-10-CM

## 2019-11-25 DIAGNOSIS — I48.91 UNSPECIFIED ATRIAL FIBRILLATION: ICD-10-CM

## 2019-11-25 DIAGNOSIS — T84.84XA PAIN DUE TO INTERNAL ORTHOPEDIC PROSTHETIC DEVICES, IMPLANTS AND GRAFTS, INITIAL ENCOUNTER: ICD-10-CM

## 2019-11-25 DIAGNOSIS — E78.5 HYPERLIPIDEMIA, UNSPECIFIED: ICD-10-CM
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
## 2019-11-25 DIAGNOSIS — M71.372 OTHER BURSAL CYST, LEFT ANKLE AND FOOT: ICD-10-CM

## 2019-11-25 DIAGNOSIS — Z79.1 LONG TERM (CURRENT) USE OF NON-STEROIDAL ANTI-INFLAMMATORIES (NSAID): ICD-10-CM

## 2020-01-03 NOTE — ASU DISCHARGE PLAN (ADULT/PEDIATRIC) - DISCHARGE TO
Pt discharged. Discharge assessments completed. No changes. All discharge education and information given. Pt instructed to go to ED for any shortness of breath, chest pain or abd pain. Verbalized understanding. Left stable.      Lovell Halsted, LPN  33/16/07 [id number]

## 2020-12-11 PROBLEM — I48.91 UNSPECIFIED ATRIAL FIBRILLATION: Chronic | Status: ACTIVE | Noted: 2019-11-04

## 2020-12-11 PROBLEM — G45.9 TRANSIENT CEREBRAL ISCHEMIC ATTACK, UNSPECIFIED: Chronic | Status: ACTIVE | Noted: 2019-11-04

## 2020-12-11 PROBLEM — E66.9 OBESITY, UNSPECIFIED: Chronic | Status: ACTIVE | Noted: 2019-11-04

## 2020-12-16 ENCOUNTER — APPOINTMENT (OUTPATIENT)
Dept: VASCULAR SURGERY | Facility: CLINIC | Age: 60
End: 2020-12-16
Payer: COMMERCIAL

## 2020-12-16 VITALS
SYSTOLIC BLOOD PRESSURE: 119 MMHG | TEMPERATURE: 97.7 F | HEIGHT: 70 IN | HEART RATE: 93 BPM | DIASTOLIC BLOOD PRESSURE: 81 MMHG | WEIGHT: 293 LBS | BODY MASS INDEX: 41.95 KG/M2

## 2020-12-16 PROCEDURE — 29580 STRAPPING UNNA BOOT: CPT | Mod: RT

## 2020-12-16 PROCEDURE — 99072 ADDL SUPL MATRL&STAF TM PHE: CPT

## 2020-12-16 PROCEDURE — 93971 EXTREMITY STUDY: CPT

## 2020-12-16 NOTE — HISTORY OF PRESENT ILLNESS
[FreeTextEntry1] : 61 y/o f w/ known venous insufficiency, varicose veins previous venous ulcers which healed s/p bilateral RFAs (Right GSV and SSV RFA and Left GSV RFA). She presents today w/ new complaint of 2 new small wounds on the right foot and calf. Denies trauma or injury. Previously it was recommended she wear 30-40mmhg stockings, however she is wearing 20-30mmhg. She works as a  and stands for prolonged periods of time.

## 2020-12-16 NOTE — ASSESSMENT
[Arterial/Venous Disease] : arterial/venous disease [Medication Management] : medication management [Other: _____] : [unfilled] [Ulcer Care] : ulcer care [FreeTextEntry1] : 59 y/o f w/ known venous insufficiency s/p bilateral RFAs w/ new right leg wounds \par \par Medical Conservative Management leg elevation, diet and weight loss, compression stocking on LLE 30-40mmHg\par Start right leg unna boot q 5 days until wounds completely heal \par RTO next week

## 2020-12-16 NOTE — REVIEW OF SYSTEMS
[Skin Wound] : skin wound [Negative] : Heme/Lymph [As Noted in HPI] : as noted in HPI [FreeTextEntry7] : Obese

## 2020-12-16 NOTE — PHYSICAL EXAM
[2+] : left 2+ [Varicose Veins Of Lower Extremities] : bilaterally [] : bilaterally [Alert] : alert [Oriented to Person] : oriented to person [Oriented to Place] : oriented to place [Oriented to Time] : oriented to time [Calm] : calm [de-identified] : well in NAD  [FreeTextEntry1] : Bilateral lower extremities w/ overall large girth and chronic venous stasis changes. Right medial foot and lateral distal calf 3mm wounds w/ granulation. No induration or s/s of infection. Left leg skin intact.  [de-identified] : ARLEENL [de-identified] : Cooperative

## 2020-12-22 ENCOUNTER — APPOINTMENT (OUTPATIENT)
Dept: VASCULAR SURGERY | Facility: CLINIC | Age: 60
End: 2020-12-22
Payer: COMMERCIAL

## 2020-12-22 VITALS
SYSTOLIC BLOOD PRESSURE: 138 MMHG | HEART RATE: 66 BPM | TEMPERATURE: 97 F | BODY MASS INDEX: 41.95 KG/M2 | DIASTOLIC BLOOD PRESSURE: 84 MMHG | WEIGHT: 293 LBS | HEIGHT: 70 IN

## 2020-12-22 PROCEDURE — 29580 STRAPPING UNNA BOOT: CPT | Mod: RT

## 2020-12-22 PROCEDURE — 99072 ADDL SUPL MATRL&STAF TM PHE: CPT

## 2020-12-22 NOTE — HISTORY OF PRESENT ILLNESS
[FreeTextEntry1] : 59 y/o f w/ known venous insufficiency, varicose veins previous venous ulcers which healed s/p bilateral RFAs (Right GSV and SSV RFA and Left GSV RFA). She presents today w/ new complaint of 2 new small wounds on the right foot and calf. Denies trauma or injury. Previously it was recommended she wear 30-40mmhg stockings, however she is wearing 20-30mmhg. She works as a  and stands for prolonged periods of time.  [de-identified] : 12/22/20 here for f/u visit, tolerated right leg unna boot without complications. Denies any complaints or problems. Wearing 30-40mmhg stockings on the left leg.

## 2020-12-22 NOTE — REVIEW OF SYSTEMS
[As Noted in HPI] : as noted in HPI [Skin Wound] : skin wound [Negative] : Heme/Lymph [FreeTextEntry7] : Obese

## 2020-12-22 NOTE — ASSESSMENT
[Arterial/Venous Disease] : arterial/venous disease [Medication Management] : medication management [Other: _____] : [unfilled] [Ulcer Care] : ulcer care [FreeTextEntry1] : 59 y/o f w/ known venous insufficiency s/p bilateral RFAs w/ right leg wounds nearly healed\par \par Medical Conservative Management leg elevation, diet and weight loss, compression stocking on LLE 30-40mmHg\par Right leg unna boot reapplied\par RTO next week for re eval, will likely be ready for compression stockings on that side

## 2020-12-22 NOTE — PHYSICAL EXAM
[2+] : left 2+ [Varicose Veins Of Lower Extremities] : bilaterally [] : bilaterally [Alert] : alert [Oriented to Person] : oriented to person [Oriented to Place] : oriented to place [Oriented to Time] : oriented to time [Calm] : calm [de-identified] : well in NAD  [FreeTextEntry1] : Bilateral lower extremities w/ overall large girth and chronic venous stasis changes. Right medial foot and lateral distal calf 3mm wounds w/ eschar. No induration or s/s of infection. Left leg skin intact.  [de-identified] : ARLEENL [de-identified] : Cooperative

## 2020-12-29 ENCOUNTER — APPOINTMENT (OUTPATIENT)
Dept: VASCULAR SURGERY | Facility: CLINIC | Age: 60
End: 2020-12-29
Payer: COMMERCIAL

## 2020-12-29 VITALS
BODY MASS INDEX: 41.95 KG/M2 | DIASTOLIC BLOOD PRESSURE: 52 MMHG | TEMPERATURE: 97.8 F | WEIGHT: 293 LBS | HEIGHT: 70 IN | SYSTOLIC BLOOD PRESSURE: 137 MMHG | HEART RATE: 82 BPM

## 2020-12-29 DIAGNOSIS — I83.891 VARICOSE VEINS OF RIGHT LOWER EXTREMITY WITH OTHER COMPLICATIONS: ICD-10-CM

## 2020-12-29 PROCEDURE — 99213 OFFICE O/P EST LOW 20 MIN: CPT

## 2020-12-29 PROCEDURE — 99072 ADDL SUPL MATRL&STAF TM PHE: CPT

## 2020-12-29 NOTE — HISTORY OF PRESENT ILLNESS
[FreeTextEntry1] : 59 y/o f w/ known venous insufficiency, varicose veins previous venous ulcers which healed s/p bilateral RFAs (Right GSV and SSV RFA and Left GSV RFA). She presents today w/ new complaint of 2 new small wounds on the right foot and calf. Denies trauma or injury. Previously it was recommended she wear 30-40mmhg stockings, however she is wearing 20-30mmhg. She works as a  and stands for prolonged periods of time.  [de-identified] : 12/22/20 here for f/u visit, tolerated right leg unna boot without complications. Denies any complaints or problems. Wearing 30-40mmhg stockings on the left leg. \par \par 12/29/20 here for f/u on right leg wounds, tolerating right leg unna boot w/out complications. No new problems. Wearing compression stockings on left leg.

## 2020-12-29 NOTE — ASSESSMENT
[Arterial/Venous Disease] : arterial/venous disease [Medication Management] : medication management [Other: _____] : [unfilled] [Ulcer Care] : ulcer care [FreeTextEntry1] : 61 y/o f w/ known venous insufficiency s/p bilateral RFAs w/ right leg wounds healed. Unna boot is no longer needed\par \par Medical Conservative Management leg elevation, diet and weight loss, compression stocking on LLE 30-40mmHg, skin moisturizer, leg elevation prn\par Start right leg compression stocking\par May return on an as needed basis

## 2020-12-29 NOTE — PHYSICAL EXAM
[2+] : left 2+ [Varicose Veins Of Lower Extremities] : bilaterally [] : bilaterally [Alert] : alert [Oriented to Person] : oriented to person [Oriented to Place] : oriented to place [Oriented to Time] : oriented to time [Calm] : calm [de-identified] : well in NAD  [FreeTextEntry1] : Bilateral lower extremities w/ overall large girth and chronic venous stasis changes. Right medial foot 2mm wounds w/ eschar, lateral distal calf wound completely healed. No induration or s/s of infection. Left leg skin intact.  [de-identified] : ARLEENL [de-identified] : Cooperative

## 2021-09-20 ENCOUNTER — APPOINTMENT (OUTPATIENT)
Dept: SURGERY | Facility: CLINIC | Age: 61
End: 2021-09-20

## 2021-12-05 ENCOUNTER — INPATIENT (INPATIENT)
Facility: HOSPITAL | Age: 61
LOS: 1 days | Discharge: HOME | End: 2021-12-07
Attending: INTERNAL MEDICINE | Admitting: INTERNAL MEDICINE
Payer: COMMERCIAL

## 2021-12-05 VITALS
SYSTOLIC BLOOD PRESSURE: 159 MMHG | HEART RATE: 100 BPM | DIASTOLIC BLOOD PRESSURE: 96 MMHG | HEIGHT: 70 IN | RESPIRATION RATE: 17 BRPM | OXYGEN SATURATION: 100 % | TEMPERATURE: 98 F

## 2021-12-05 DIAGNOSIS — Z29.9 ENCOUNTER FOR PROPHYLACTIC MEASURES, UNSPECIFIED: ICD-10-CM

## 2021-12-05 DIAGNOSIS — R07.9 CHEST PAIN, UNSPECIFIED: ICD-10-CM

## 2021-12-05 DIAGNOSIS — I48.20 CHRONIC ATRIAL FIBRILLATION, UNSPECIFIED: ICD-10-CM

## 2021-12-05 DIAGNOSIS — Z86.73 PERSONAL HISTORY OF TRANSIENT ISCHEMIC ATTACK (TIA), AND CEREBRAL INFARCTION WITHOUT RESIDUAL DEFICITS: ICD-10-CM

## 2021-12-05 DIAGNOSIS — R06.00 DYSPNEA, UNSPECIFIED: ICD-10-CM

## 2021-12-05 DIAGNOSIS — I10 ESSENTIAL (PRIMARY) HYPERTENSION: ICD-10-CM

## 2021-12-05 DIAGNOSIS — R09.89 OTHER SPECIFIED SYMPTOMS AND SIGNS INVOLVING THE CIRCULATORY AND RESPIRATORY SYSTEMS: ICD-10-CM

## 2021-12-05 DIAGNOSIS — E78.5 HYPERLIPIDEMIA, UNSPECIFIED: ICD-10-CM

## 2021-12-05 LAB
ALBUMIN SERPL ELPH-MCNC: 4.5 G/DL — SIGNIFICANT CHANGE UP (ref 3.3–5)
ALP SERPL-CCNC: 90 U/L — SIGNIFICANT CHANGE UP (ref 40–120)
ALT FLD-CCNC: 33 U/L — SIGNIFICANT CHANGE UP (ref 4–33)
ANION GAP SERPL CALC-SCNC: 12 MMOL/L — SIGNIFICANT CHANGE UP (ref 7–14)
APTT BLD: 37.2 SEC — HIGH (ref 27–36.3)
AST SERPL-CCNC: 27 U/L — SIGNIFICANT CHANGE UP (ref 4–32)
BASE EXCESS BLDV CALC-SCNC: 2.7 MMOL/L — SIGNIFICANT CHANGE UP (ref -2–3)
BASOPHILS # BLD AUTO: 0.02 K/UL — SIGNIFICANT CHANGE UP (ref 0–0.2)
BASOPHILS NFR BLD AUTO: 0.3 % — SIGNIFICANT CHANGE UP (ref 0–2)
BILIRUB SERPL-MCNC: 0.5 MG/DL — SIGNIFICANT CHANGE UP (ref 0.2–1.2)
BLD GP AB SCN SERPL QL: NEGATIVE — SIGNIFICANT CHANGE UP
BLOOD GAS VENOUS COMPREHENSIVE RESULT: SIGNIFICANT CHANGE UP
BUN SERPL-MCNC: 19 MG/DL — SIGNIFICANT CHANGE UP (ref 7–23)
CALCIUM SERPL-MCNC: 9.7 MG/DL — SIGNIFICANT CHANGE UP (ref 8.4–10.5)
CHLORIDE BLDV-SCNC: 107 MMOL/L — SIGNIFICANT CHANGE UP (ref 96–108)
CHLORIDE SERPL-SCNC: 107 MMOL/L — SIGNIFICANT CHANGE UP (ref 98–107)
CK MB BLD-MCNC: 2.3 % — SIGNIFICANT CHANGE UP (ref 0–2.5)
CK MB CFR SERPL CALC: 4.7 NG/ML — SIGNIFICANT CHANGE UP
CK SERPL-CCNC: 204 U/L — HIGH (ref 25–170)
CO2 BLDV-SCNC: 29.3 MMOL/L — HIGH (ref 22–26)
CO2 SERPL-SCNC: 25 MMOL/L — SIGNIFICANT CHANGE UP (ref 22–31)
CREAT SERPL-MCNC: 0.82 MG/DL — SIGNIFICANT CHANGE UP (ref 0.5–1.3)
EOSINOPHIL # BLD AUTO: 0.13 K/UL — SIGNIFICANT CHANGE UP (ref 0–0.5)
EOSINOPHIL NFR BLD AUTO: 2.2 % — SIGNIFICANT CHANGE UP (ref 0–6)
GAS PNL BLDV: 139 MMOL/L — SIGNIFICANT CHANGE UP (ref 136–145)
GLUCOSE BLDV-MCNC: 118 MG/DL — HIGH (ref 70–99)
GLUCOSE SERPL-MCNC: 118 MG/DL — HIGH (ref 70–99)
HCO3 BLDV-SCNC: 28 MMOL/L — SIGNIFICANT CHANGE UP (ref 22–29)
HCT VFR BLD CALC: 40.5 % — SIGNIFICANT CHANGE UP (ref 34.5–45)
HCT VFR BLDA CALC: 40 % — SIGNIFICANT CHANGE UP (ref 34.5–46.5)
HGB BLD CALC-MCNC: 13.2 G/DL — SIGNIFICANT CHANGE UP (ref 11.5–15.5)
HGB BLD-MCNC: 13.4 G/DL — SIGNIFICANT CHANGE UP (ref 11.5–15.5)
IANC: 4.26 K/UL — SIGNIFICANT CHANGE UP (ref 1.5–8.5)
IMM GRANULOCYTES NFR BLD AUTO: 0.5 % — SIGNIFICANT CHANGE UP (ref 0–1.5)
INR BLD: 1.13 RATIO — SIGNIFICANT CHANGE UP (ref 0.88–1.16)
LACTATE BLDV-MCNC: 0.8 MMOL/L — SIGNIFICANT CHANGE UP (ref 0.5–2)
LYMPHOCYTES # BLD AUTO: 1.09 K/UL — SIGNIFICANT CHANGE UP (ref 1–3.3)
LYMPHOCYTES # BLD AUTO: 18.3 % — SIGNIFICANT CHANGE UP (ref 13–44)
MCHC RBC-ENTMCNC: 31.5 PG — SIGNIFICANT CHANGE UP (ref 27–34)
MCHC RBC-ENTMCNC: 33.1 GM/DL — SIGNIFICANT CHANGE UP (ref 32–36)
MCV RBC AUTO: 95.3 FL — SIGNIFICANT CHANGE UP (ref 80–100)
MONOCYTES # BLD AUTO: 0.44 K/UL — SIGNIFICANT CHANGE UP (ref 0–0.9)
MONOCYTES NFR BLD AUTO: 7.4 % — SIGNIFICANT CHANGE UP (ref 2–14)
NEUTROPHILS # BLD AUTO: 4.26 K/UL — SIGNIFICANT CHANGE UP (ref 1.8–7.4)
NEUTROPHILS NFR BLD AUTO: 71.3 % — SIGNIFICANT CHANGE UP (ref 43–77)
NRBC # BLD: 0 /100 WBCS — SIGNIFICANT CHANGE UP
NRBC # FLD: 0 K/UL — SIGNIFICANT CHANGE UP
PCO2 BLDV: 44 MMHG — HIGH (ref 39–42)
PH BLDV: 7.41 — SIGNIFICANT CHANGE UP (ref 7.32–7.43)
PLATELET # BLD AUTO: 183 K/UL — SIGNIFICANT CHANGE UP (ref 150–400)
PO2 BLDV: 74 MMHG — SIGNIFICANT CHANGE UP
POTASSIUM BLDV-SCNC: 4.2 MMOL/L — SIGNIFICANT CHANGE UP (ref 3.5–5.1)
POTASSIUM SERPL-MCNC: 4.5 MMOL/L — SIGNIFICANT CHANGE UP (ref 3.5–5.3)
POTASSIUM SERPL-SCNC: 4.5 MMOL/L — SIGNIFICANT CHANGE UP (ref 3.5–5.3)
PROT SERPL-MCNC: 7 G/DL — SIGNIFICANT CHANGE UP (ref 6–8.3)
PROTHROM AB SERPL-ACNC: 12.9 SEC — SIGNIFICANT CHANGE UP (ref 10.6–13.6)
RBC # BLD: 4.25 M/UL — SIGNIFICANT CHANGE UP (ref 3.8–5.2)
RBC # FLD: 12.8 % — SIGNIFICANT CHANGE UP (ref 10.3–14.5)
RH IG SCN BLD-IMP: POSITIVE — SIGNIFICANT CHANGE UP
SAO2 % BLDV: 95 % — SIGNIFICANT CHANGE UP
SODIUM SERPL-SCNC: 144 MMOL/L — SIGNIFICANT CHANGE UP (ref 135–145)
TROPONIN T, HIGH SENSITIVITY RESULT: 17 NG/L — SIGNIFICANT CHANGE UP
WBC # BLD: 5.97 K/UL — SIGNIFICANT CHANGE UP (ref 3.8–10.5)
WBC # FLD AUTO: 5.97 K/UL — SIGNIFICANT CHANGE UP (ref 3.8–10.5)

## 2021-12-05 PROCEDURE — 99285 EMERGENCY DEPT VISIT HI MDM: CPT | Mod: 25

## 2021-12-05 PROCEDURE — 71045 X-RAY EXAM CHEST 1 VIEW: CPT | Mod: 26

## 2021-12-05 PROCEDURE — 93010 ELECTROCARDIOGRAM REPORT: CPT

## 2021-12-05 PROCEDURE — 99223 1ST HOSP IP/OBS HIGH 75: CPT

## 2021-12-05 RX ORDER — ACETAMINOPHEN 500 MG
650 TABLET ORAL EVERY 6 HOURS
Refills: 0 | Status: DISCONTINUED | OUTPATIENT
Start: 2021-12-05 | End: 2021-12-07

## 2021-12-05 RX ORDER — LISINOPRIL 2.5 MG/1
1 TABLET ORAL
Qty: 0 | Refills: 0 | DISCHARGE

## 2021-12-05 RX ORDER — RIVAROXABAN 15 MG-20MG
1 KIT ORAL
Qty: 0 | Refills: 0 | DISCHARGE

## 2021-12-05 RX ORDER — ATORVASTATIN CALCIUM 80 MG/1
1 TABLET, FILM COATED ORAL
Qty: 0 | Refills: 0 | DISCHARGE

## 2021-12-05 RX ORDER — METOPROLOL TARTRATE 50 MG
1 TABLET ORAL
Qty: 0 | Refills: 0 | DISCHARGE

## 2021-12-05 RX ORDER — ONDANSETRON 8 MG/1
4 TABLET, FILM COATED ORAL EVERY 8 HOURS
Refills: 0 | Status: DISCONTINUED | OUTPATIENT
Start: 2021-12-05 | End: 2021-12-07

## 2021-12-05 RX ORDER — LANOLIN ALCOHOL/MO/W.PET/CERES
3 CREAM (GRAM) TOPICAL AT BEDTIME
Refills: 0 | Status: DISCONTINUED | OUTPATIENT
Start: 2021-12-05 | End: 2021-12-07

## 2021-12-05 RX ORDER — CHOLECALCIFEROL (VITAMIN D3) 125 MCG
1 CAPSULE ORAL
Qty: 0 | Refills: 0 | DISCHARGE

## 2021-12-05 RX ORDER — ENOXAPARIN SODIUM 100 MG/ML
40 INJECTION SUBCUTANEOUS EVERY 12 HOURS
Refills: 0 | Status: DISCONTINUED | OUTPATIENT
Start: 2021-12-05 | End: 2021-12-06

## 2021-12-05 RX ORDER — FERROUS SULFATE 325(65) MG
1 TABLET ORAL
Qty: 0 | Refills: 0 | DISCHARGE

## 2021-12-05 RX ORDER — METOPROLOL TARTRATE 50 MG
100 TABLET ORAL DAILY
Refills: 0 | Status: DISCONTINUED | OUTPATIENT
Start: 2021-12-05 | End: 2021-12-07

## 2021-12-05 RX ORDER — ATORVASTATIN CALCIUM 80 MG/1
40 TABLET, FILM COATED ORAL AT BEDTIME
Refills: 0 | Status: DISCONTINUED | OUTPATIENT
Start: 2021-12-05 | End: 2021-12-07

## 2021-12-05 RX ORDER — IBUPROFEN 200 MG
1 TABLET ORAL
Qty: 0 | Refills: 0 | DISCHARGE

## 2021-12-05 NOTE — H&P ADULT - NSHPADDITIONALINFOADULT_GEN_ALL_CORE
Patient seen and examined on 12/5/21 Patient seen and examined on 12/5/21, discussed with Dr. Keyes, Dr. Keyes to assume care of patient in AM

## 2021-12-05 NOTE — H&P ADULT - NSHPLABSRESULTS_GEN_ALL_CORE
LABS and ADDITIONAL STUDIES:                        13.4   5.97  )-----------( 183      ( 05 Dec 2021 20:49 )             40.5     12-05    144  |  107  |  19  ----------------------------<  118<H>  4.5   |  25  |  0.82    Ca    9.7      05 Dec 2021 20:49    TPro  7.0  /  Alb  4.5  /  TBili  0.5  /  DBili  x   /  AST  27  /  ALT  33  /  AlkPhos  90  12-05    CARDIAC MARKERS ( 05 Dec 2021 20:51 )  x     / x     / 204 U/L / x     / 4.7 ng/mL  Troponin T, High Sensitivity (12.05.21 @ 20:51)   Troponin T, High Sensitivity Result: 17 ng/L     LIVER FUNCTIONS - ( 05 Dec 2021 20:49 )  Alb: 4.5 g/dL / Pro: 7.0 g/dL / ALK PHOS: 90 U/L / ALT: 33 U/L / AST: 27 U/L / GGT: x           PT/INR - ( 05 Dec 2021 20:49 )   PT: 12.9 sec;   INR: 1.13 ratio    PTT - ( 05 Dec 2021 20:49 )  PTT:37.2 sec    < from: Xray Chest 1 View- PORTABLE-Urgent (Xray Chest 1 View- PORTABLE-Urgent .) (12.05.21 @ 20:42) >  ******PRELIMINARY REPORT******        INTERPRETATION:  clear lungs  < end of copied text >    EKG - AFib at 74, QTc 415, no significant ST-T wave changes

## 2021-12-05 NOTE — H&P ADULT - ASSESSMENT
This is a 61F with history of AFib, HTN, and TIA who presents to the hospital with worsening SANDERS and intermittent chest pain with outpatient TTE showing decreased in cardiac function.

## 2021-12-05 NOTE — ED ADULT NURSE NOTE - OBJECTIVE STATEMENT
Patient arrives to room 25, A&Ox4 , ambulatory at baseline. patient arrives to the ER for scheduled angio/cath per outpatient cardiologist. patient states she has afib and recent dx of pulmonary HTN and was advised to come to the ER for preop blood work and COVID test before procedure tomorrow. patient takes xarelto for afib but was directed to stop it yesterday r/t procedure tomorrow. breathing even and unlabored, pallor/diaphoresis not noted. BL LE edema noted. Denies chest pain, shortness of breath, nausea, vomiting or diarrhea. 18G IV placed in R AC. vital signs as charted. Patient pending lab results at this time.

## 2021-12-05 NOTE — H&P ADULT - NSHPPHYSICALEXAM_GEN_ALL_CORE
Vital Signs Last 24 Hrs  T(C): 36.7 (05 Dec 2021 20:46), Max: 36.7 (05 Dec 2021 18:26)  T(F): 98 (05 Dec 2021 20:46), Max: 98 (05 Dec 2021 18:26)  HR: 88 (05 Dec 2021 20:46) (88 - 100)  BP: 138/72 (05 Dec 2021 20:46) (138/72 - 159/96)  BP(mean): --  RR: 19 (05 Dec 2021 20:46) (17 - 19)  SpO2: 100% (05 Dec 2021 20:46) (100% - 100%)    GENERAL: No acute distress, well-developed  EYES: EOMI, PERRL, conjunctiva and sclera clear  ENT: Neck supple, No JVD, moist mucosa  CHEST/LUNG: Clear to auscultation bilaterally; No wheeze, equal breath sounds bilaterally   BACK: No spinal tenderness, no CVA TTP  HEART: Irregular rate and rhythm; No murmurs, rubs, or gallops  ABDOMEN: Soft, Nontender, Nondistended; Bowel sounds present  EXTREMITIES: +2+ DP/PT/radial pulses, 1+ pitting edema b/l LE up to mid shins  PSYCH: Nl behavior, nl affect  NEUROLOGY: AAOx3, non-focal, cranial nerves intact  SKIN: Normal color, No rashes or lesions

## 2021-12-05 NOTE — ED PROVIDER NOTE - CLINICAL SUMMARY MEDICAL DECISION MAKING FREE TEXT BOX
62 yo F with a pmhx of HTN, Afib, obesity presents to the ED after being sent in by PMD. Dr. Stephy Montez. VSS. PE as noted above. no acute distress. singificant changes on echo based on conversation with Dr. Keyes, planned for TTE and cath. will order labs, imaging, ekg, reassess. admit to amin

## 2021-12-05 NOTE — ED PROVIDER NOTE - OBJECTIVE STATEMENT
60 yo F with a pmhx of HTN, Afib, obesity presents to the ED after being sent in by PMD. Dr. Stephy Montez. She had a recent TTE with significant change compared to prior echos. Patient admits to worsening dyspnea on exertion. denies any increased lower extremity swelling. She denies any CP. Has been usual state of health prior to arrival to the ED. She is planed for repeat TTE and cath as per Dr. Keyes. She denies any other symptoms at bedside.

## 2021-12-05 NOTE — ED PROVIDER NOTE - NSICDXPASTMEDICALHX_GEN_ALL_CORE_FT
PAST MEDICAL HISTORY:  Afib On Xarelto    HLD (hyperlipidemia) Not on meds    HTN - Hypertension borderline, not on meds    Obesity     PUD (peptic ulcer disease) not on PPI    TIA (transient ischemic attack)

## 2021-12-05 NOTE — ED PROVIDER NOTE - EKG ADDITIONAL INFORMATION FREE TEXT
NSR, no ST segement alterations. sinus rhythm without life-threatening arrhythmic patter such as short or long FL interval, ipsilon wave form, or Brugada pattern.

## 2021-12-05 NOTE — ED ADULT NURSE NOTE - NSIMPLEMENTINTERV_GEN_ALL_ED
Implemented All Fall with Harm Risk Interventions:  [place] to call system. Call bell, personal items and telephone within reach. Instruct patient to call for assistance. Room bathroom lighting operational. Non-slip footwear when patient is off stretcher. Physically safe environment: no spills, clutter or unnecessary equipment. Stretcher in lowest position, wheels locked, appropriate side rails in place. Provide visual cue, wrist band, yellow gown, etc. Monitor gait and stability. Monitor for mental status changes and reorient to person, place, and time. Review medications for side effects contributing to fall risk. Reinforce activity limits and safety measures with patient and family. Provide visual clues: red socks.

## 2021-12-05 NOTE — ED PROVIDER NOTE - NSICDXPASTSURGICALHX_GEN_ALL_CORE_FT
PAST SURGICAL HISTORY:  Ankle fracture, left s/p Surgery--2009--[facility]    Foot fracture, left s/p Surgery --2009--[facility]

## 2021-12-05 NOTE — ED ADULT TRIAGE NOTE - CHIEF COMPLAINT QUOTE
Pt sent for admission by cardiologist for angiogram. Pt recent dx of pulmonary hypertension. PMH Atrial Fibulation (stopped taking Xarelto for procedure yesterday), HTN. Pt asymptomatic in triage

## 2021-12-05 NOTE — H&P ADULT - PROBLEM SELECTOR PLAN 3
- On xarelto, on hold for now for cardiac cath, CHADSVASC socre 4, restart xarelto as per cardiology  - Rates currently well controlled

## 2021-12-05 NOTE — ED ADULT NURSE NOTE - NSICDXPASTSURGICALHX_GEN_ALL_CORE_FT
PAST SURGICAL HISTORY:  Ankle fracture, left s/p Surgery--2009--[facility]    Foot fracture, left s/p Surgery --2009--[facility]

## 2021-12-05 NOTE — H&P ADULT - NSHPREVIEWOFSYSTEMS_GEN_ALL_CORE
REVIEW OF SYSTEMS:    CONSTITUTIONAL: No weakness, fevers or chills  EYES: No visual changes or eye discharge  ENT: No rhinorrhea or sore throat  NECK: No pain or stiffness  RESPIRATORY: No cough, wheezing, hemoptysis; No shortness of breath  CARDIOVASCULAR: +SANDERS, +intermittent chest pain, No palpitations; +Intermittent lower extremity edema  GASTROINTESTINAL: No abdominal or epigastric pain. No nausea, vomiting, or hematemesis; No diarrhea or constipation. No melena or hematochezia.  BACK: No current back pain, no flank pain  GENITOURINARY: No dysuria, frequency or hematuria  NEUROLOGICAL: No numbness or weakness  SKIN: No itching, burning, rashes, or lesions

## 2021-12-05 NOTE — ED PROVIDER NOTE - ATTENDING CONTRIBUTION TO CARE
I have personally seen and examined this patient.  I have fully participated in the care of this patient. I have reviewed all pertinent clinical information, including history, physical exam, plan and the Resident’s note and agree except as noted. - MD Jennifer.    60 yo F, h.o PAH, afib on Xarelto, sent by Cardiologist for angiogram, otherwise pt denies chest pain or acutely worsening sob, pt is not hypoxic, stable for admission, labs, ekg, admission.

## 2021-12-05 NOTE — ED PROVIDER NOTE - PHYSICAL EXAMINATION
GENERAL: no acute distress, non-toxic appearing, obese  HEAD: normocephalic, atraumatic  HEENT: normal conjunctiva, oral mucosa moist, neck supple  CARDIAC: regular rate and rhythm, normal S1 and S2,  no appreciable murmurs  PULM: clear to ascultation bilaterally, no crackles, rales, rhonchi, or wheezing  GI: abdomen nondistended, soft, nontender, no guarding or rebound tenderness  : no CVA tenderness, no suprapubic tenderness  NEURO: alert and oriented x 3, normal speech, PERRLA, EOMI, no focal motor or sensory deficits  MSK: no visible deformities, no peripheral edema, calf tenderness/redness/swelling  SKIN: no visible rashes, dry, well-perfused  PSYCH: appropriate mood and affect

## 2021-12-05 NOTE — H&P ADULT - PROBLEM SELECTOR PLAN 1
- New onset SANDERS with intermittent chest pain and recent outpatient TTE with decreased cardiac function concerning for cardiac issues as a cause of her SANDERS  - No symptoms currently, EKG non-ischemic, initial trop low at 17  - Would c/w telemetry monitoring, trend cardiac enzymes  - Check A1C, lipid profile, TSH in AM  - Noted to have some LE pitting edema, lungs clear to auscultation and no JVD so lower suspicion of ADHF but would check proBNP in AM  - Plan for R and L cardiac cath in AM as per Dr. Keyes, would therefore make patient NPO p MN, hold lisinopril, patient has been holding xarelto since yesterday - New onset SANDERS with intermittent chest pain and recent outpatient TTE with decreased cardiac function concerning for cardiac issues as a cause of her SANDERS  - No symptoms currently, EKG non-ischemic, initial trop low at 17  - Would c/w telemetry monitoring, trend cardiac enzymes  - Check A1C, lipid profile, TSH in AM  - Noted to have some LE pitting edema, lungs clear to auscultation and no JVD so lower suspicion of ADHF but would check proBNP in AM, check US duplex venous b/l LE though low suspicion for DVT given patient has been on xarelto for her AFib  - Plan for R and L cardiac cath in AM as per Dr. Keyes, would therefore make patient NPO p MN, hold lisinopril, patient has been holding xarelto since yesterday

## 2021-12-05 NOTE — H&P ADULT - HISTORY OF PRESENT ILLNESS
This is a 61F with history of Atrial Fibrillation, HTN, and TIA who presents to the hospital for cardiac catheterization. Said that she had noted worsening dyspnea on exertion with associated intermittent chest pain (random) and was referred to Dr. Keyes who performed a TTE and noted that the patient's LV function was decreased and therefore referred her to [facility] for cardiac catheterization. States that she notices some SANDERS with exertion but it does not inhibit her exercise tolerance. States she has some LE edema at times and wears compression stockings to help alleviate the swelling. Also notes some intermittent mid back pain but states that likely due to her posture. Currently denies any chest pain, back pain, or SANDERS/SOB. No other acute complaints.     On arrival to the ED, her vitals were T 98, P 100, /96, RR 17, O2 sat 100% RA. Her lab work did not show significant abnormalities. She was admitted to telemetry.  This is a 61F with history of Atrial Fibrillation, HTN, and TIA who presents to the hospital for cardiac catheterization. Said that she had noted worsening dyspnea on exertion with associated intermittent chest pain (random) and was referred to Dr. Keyes who performed a TTE and noted that the patient's cardiac function was decreased and therefore referred her to [facility] for cardiac catheterization. States that she notices some SANDERS with exertion but it does not inhibit her exercise tolerance. States she has some LE edema at times and wears compression stockings to help alleviate the swelling. Also notes some intermittent mid back pain but states that likely due to her posture. Currently denies any chest pain, back pain, or SANDERS/SOB. No other acute complaints.     On arrival to the ED, her vitals were T 98, P 100, /96, RR 17, O2 sat 100% RA. Her lab work did not show significant abnormalities. She was admitted to telemetry.

## 2021-12-05 NOTE — H&P ADULT - NSICDXPASTSURGICALHX_GEN_ALL_CORE_FT
PAST SURGICAL HISTORY:  Ankle fracture, left s/p Surgery--2009--[facility]    Foot fracture, left s/p Surgery --2009--[facility]

## 2021-12-05 NOTE — H&P ADULT - PROBLEM SELECTOR PLAN 2
- c/o intermittent chest pain, none currently, EKG non ischemic, trop low at 17  - Would c/w trending trops, monitor on telemetry, has recent outpatient TTE with decreased cardiac function and being planned for an inpatient R and L cardiac catheterization

## 2021-12-05 NOTE — H&P ADULT - PROBLEM SELECTOR PLAN 4
- Will hold lisinopril for now given patient being planned for cardiac catheterization in AM  - c/w metoprolol

## 2021-12-06 LAB
CK MB BLD-MCNC: 2.2 % — SIGNIFICANT CHANGE UP (ref 0–2.5)
CK MB CFR SERPL CALC: 3.6 NG/ML — SIGNIFICANT CHANGE UP
CK SERPL-CCNC: 162 U/L — SIGNIFICANT CHANGE UP (ref 25–170)
SARS-COV-2 RNA SPEC QL NAA+PROBE: SIGNIFICANT CHANGE UP
TROPONIN T, HIGH SENSITIVITY RESULT: 15 NG/L — SIGNIFICANT CHANGE UP

## 2021-12-06 PROCEDURE — 93460 R&L HRT ART/VENTRICLE ANGIO: CPT | Mod: 26

## 2021-12-06 PROCEDURE — 93970 EXTREMITY STUDY: CPT | Mod: 26

## 2021-12-06 RX ORDER — INFLUENZA VIRUS VACCINE 15; 15; 15; 15 UG/.5ML; UG/.5ML; UG/.5ML; UG/.5ML
0.5 SUSPENSION INTRAMUSCULAR ONCE
Refills: 0 | Status: DISCONTINUED | OUTPATIENT
Start: 2021-12-06 | End: 2021-12-07

## 2021-12-06 RX ADMIN — Medication 100 MILLIGRAM(S): at 07:04

## 2021-12-06 RX ADMIN — ATORVASTATIN CALCIUM 40 MILLIGRAM(S): 80 TABLET, FILM COATED ORAL at 21:46

## 2021-12-06 NOTE — PROGRESS NOTE ADULT - PROBLEM SELECTOR PLAN 1
- New onset SANDERS with intermittent chest pain and recent outpatient TTE with decreased cardiac function concerning for cardiac issues as a cause of her SANDERS  - No symptoms currently, EKG non-ischemic, initial trop low at 17  - Would c/w telemetry monitoring, trend cardiac enzymes  - cath today

## 2021-12-06 NOTE — PROGRESS NOTE ADULT - SUBJECTIVE AND OBJECTIVE BOX
DATE OF SERVICE:    Patient was seen and examined on     .Interim events noted.Consultant notes ,Labs,Telemetry reviewed by me    PRESENTING CC:    HPI and HOSPITAL COURSE: HPI:  This is a 61F with history of Atrial Fibrillation, HTN, and TIA who presents to the hospital for cardiac catheterization. Said that she had noted worsening dyspnea on exertion with associated intermittent chest pain (random) and was referred to Dr. Keyes who performed a TTE and noted that the patient's cardiac function was decreased and therefore referred her to [facility] for cardiac catheterization. States that she notices some SANDERS with exertion but it does not inhibit her exercise tolerance. States she has some LE edema at times and wears compression stockings to help alleviate the swelling. Also notes some intermittent mid back pain but states that likely due to her posture. Currently denies any chest pain, back pain, or SANDERS/SOB. No other acute complaints.     On arrival to the ED, her vitals were T 98, P 100, /96, RR 17, O2 sat 100% RA. Her lab work did not show significant abnormalities. She was admitted to telemetry.  (05 Dec 2021 22:39)      INTERIM EVENTS:      PMH -reviewed admission note, no change since admission  Heart Failure: Acute [ ] Chronic [ ] Acute on Chronic [ ] Diastolic [ ] Systolic [ ] Combined Systolic and Diastolic[ ]  PATRIA[ ]  ATN[ ]  CKD I [ ] CKDII [ ] CKD III [ ] CKD IV [ ] CKD V [ ] ESRD[ ]  HTN[ ] CVA[ ] DM[ ] COPD[ ] COVID[ ] AF[ ]  PPM[ ] ICD[ ]    MEDICATIONS  (STANDING):  atorvastatin 40 milliGRAM(s) Oral at bedtime  influenza   Vaccine 0.5 milliLiter(s) IntraMuscular once  metoprolol succinate  milliGRAM(s) Oral daily    MEDICATIONS  (PRN):  acetaminophen     Tablet .. 650 milliGRAM(s) Oral every 6 hours PRN Temp greater or equal to 38C (100.4F), Mild Pain (1 - 3)  aluminum hydroxide/magnesium hydroxide/simethicone Suspension 30 milliLiter(s) Oral every 4 hours PRN Dyspepsia  melatonin 3 milliGRAM(s) Oral at bedtime PRN Insomnia  ondansetron Injectable 4 milliGRAM(s) IV Push every 8 hours PRN Nausea and/or Vomiting            REVIEW OF SYSTEMS:  Constitutional: [ ] fever, [ ]weight loss,  [ ]fatigue  Eyes: [ ] visual changes  Respiratory: [ ]shortness of breath;  [ ] cough, [ ]wheezing, [ ]chills, [ ]hemoptysis  Cardiovascular: [ ] chest pain, [ ]palpitations, [ ]dizziness,  [ ]leg swelling[ ]orthopnea[ ]PND  Gastrointestinal: [ ] abdominal pain, [ ]nausea, [ ]vomiting,  [ ]diarrhea [ ]Constipation [ ]Melena  Genitourinary: [ ] dysuria, [ ] hematuria [ ][place]  Neurologic: [ ] headaches [ ] tremors[ ]weakness [ ]Paralysis Right[ ] Left[ ]  Skin: [ ] itching, [ ]burning, [ ] rashes  Endocrine: [ ] heat or cold intolerance  Musculoskeletal: [ ] joint pain or swelling; [ ] muscle, back, or extremity pain  Psychiatric: [ ] depression, [ ]anxiety, [ ]mood swings, or [ ]difficulty sleeping  Hematologic: [ ] easy bruising, [ ] bleeding gums    [x] All remaining systems negative except as per above.   [ ]Unable to obtain.    Vital Signs Last 24 Hrs  T(C): 36.7 (06 Dec 2021 06:47), Max: 36.7 (05 Dec 2021 18:26)  T(F): 98 (06 Dec 2021 06:47), Max: 98.1 (06 Dec 2021 02:41)  HR: 70 (06 Dec 2021 06:47) (65 - 100)  BP: 129/84 (06 Dec 2021 06:47) (123/67 - 159/96)  BP(mean): --  RR: 18 (06 Dec 2021 06:47) (17 - 19)  SpO2: 98% (06 Dec 2021 06:47) (96% - 100%)  I&O's Summary      PHYSICAL EXAM:  General: No acute distress BMI-  HEENT: EOMI, PERRL  Neck: Supple, [ ] JVD  Lungs: Equal air entry bilaterally; [ ] rales [ ] wheezing [ ] rhonchi  Heart: Regular rate and rhythm; [ ] murmur   /6 [ ] systolic [ ] diastolic [ ] radiation[ ] rubs [ ]  gallops  Abdomen: Nontender, bowel sounds present  Extremities: No clubbing, cyanosis, [ ] edema [ ]Pulses  equal and intact  Nervous system:  Alert & Oriented X3, no focal deficits  Psychiatric: Normal affect  Skin: No rashes or lesions    LABS:  12-05    144  |  107  |  19  ----------------------------<  118<H>  4.5   |  25  |  0.82    Ca    9.7      05 Dec 2021 20:49    TPro  7.0  /  Alb  4.5  /  TBili  0.5  /  DBili  x   /  AST  27  /  ALT  33  /  AlkPhos  90  12-05    Creatinine Trend: 0.82<--                        13.4   5.97  )-----------( 183      ( 05 Dec 2021 20:49 )             40.5     PT/INR - ( 05 Dec 2021 20:49 )   PT: 12.9 sec;   INR: 1.13 ratio         PTT - ( 05 Dec 2021 20:49 )  PTT:37.2 sec    Cardiac Enzymes: CARDIAC MARKERS ( 06 Dec 2021 00:30 )  x     / x     / 162 U/L / x     / 3.6 ng/mL  CARDIAC MARKERS ( 05 Dec 2021 20:51 )  x     / x     / 204 U/L / x     / 4.7 ng/mL            RADIOLOGY:    ECG [my interpretation]:    TELEMETRY:Reviewed monitor tracings-    ECHO:    STRESS TEST:    CATHETERIZATION:      IMPRESSION AND PLAN:      
    SUBJECTIVE / OVERNIGHT EVENTS:pt seen and examined      MEDICATIONS  (STANDING):  atorvastatin 40 milliGRAM(s) Oral at bedtime  influenza   Vaccine 0.5 milliLiter(s) IntraMuscular once  metoprolol succinate  milliGRAM(s) Oral daily    MEDICATIONS  (PRN):  acetaminophen     Tablet .. 650 milliGRAM(s) Oral every 6 hours PRN Temp greater or equal to 38C (100.4F), Mild Pain (1 - 3)  aluminum hydroxide/magnesium hydroxide/simethicone Suspension 30 milliLiter(s) Oral every 4 hours PRN Dyspepsia  melatonin 3 milliGRAM(s) Oral at bedtime PRN Insomnia  ondansetron Injectable 4 milliGRAM(s) IV Push every 8 hours PRN Nausea and/or Vomiting    Vital Signs Last 24 Hrs  T(C): 36.7 (12-06-21 @ 21:45), Max: 36.7 (12-06-21 @ 02:41)  T(F): 98 (12-06-21 @ 21:45), Max: 98.1 (12-06-21 @ 02:41)  HR: 59 (12-06-21 @ 21:45) (59 - 70)  BP: 113/70 (12-06-21 @ 21:45) (113/70 - 148/83)  BP(mean): --  RR: 18 (12-06-21 @ 21:45) (18 - 18)  SpO2: 97% (12-06-21 @ 21:45) (96% - 100%)        CAPILLARY BLOOD GLUCOSE        I&O's Summary      Constitutional: No fever, fatigue  Skin: No rash.  Eyes: No recent vision problems or eye pain.  ENT: No congestion, ear pain, or sore throat.  Cardiovascular: No chest pain or palpation.  Respiratory: No cough, shortness of breath, congestion, or wheezing.  Gastrointestinal: No abdominal pain, nausea, vomiting, or diarrhea.  Genitourinary: No dysuria.  Musculoskeletal: No joint swelling.  Neurologic: No headache.    PHYSICAL EXAM:  GENERAL: NAD  EYES: EOMI, PERRLA  NECK: Supple, No JVD  CHEST/LUNG: dec breath sounds at bases  HEART:  S1 , S2 +  ABDOMEN: soft , bs+  EXTREMITIES:  no edema  NEUROLOGY:alert awake      LABS:                        13.4   5.97  )-----------( 183      ( 05 Dec 2021 20:49 )             40.5     12-05    144  |  107  |  19  ----------------------------<  118<H>  4.5   |  25  |  0.82    Ca    9.7      05 Dec 2021 20:49    TPro  7.0  /  Alb  4.5  /  TBili  0.5  /  DBili  x   /  AST  27  /  ALT  33  /  AlkPhos  90  12-05    PT/INR - ( 05 Dec 2021 20:49 )   PT: 12.9 sec;   INR: 1.13 ratio         PTT - ( 05 Dec 2021 20:49 )  PTT:37.2 sec  CARDIAC MARKERS ( 06 Dec 2021 00:30 )  x     / x     / 162 U/L / x     / 3.6 ng/mL  CARDIAC MARKERS ( 05 Dec 2021 20:51 )  x     / x     / 204 U/L / x     / 4.7 ng/mL          RADIOLOGY & ADDITIONAL TESTS:    Imaging Personally Reviewed:    Consultant(s) Notes Reviewed:      Care Discussed with Consultants/Other Providers:

## 2021-12-06 NOTE — PATIENT PROFILE ADULT - FALL HARM RISK - HARM RISK INTERVENTIONS

## 2021-12-06 NOTE — CHART NOTE - NSCHARTNOTEFT_GEN_A_CORE
CHUCK CORDERO 61y Female s/p cath via radial site and brachial site check. Dressing is clear/dry/intact. Site is without hematoma or bleeding.   Patient denies pain, numbness, tingling, CP, SOB. VSS. Will continue to monitor patient closely.       Vital Signs Last 24 Hrs  T(C): 36.7 (06 Dec 2021 21:45), Max: 36.7 (06 Dec 2021 02:41)  T(F): 98 (06 Dec 2021 21:45), Max: 98.1 (06 Dec 2021 02:41)  HR: 59 (06 Dec 2021 21:45) (59 - 70)  BP: 113/70 (06 Dec 2021 21:45) (113/70 - 148/83)  RR: 18 (06 Dec 2021 21:45) (18 - 18)  SpO2: 97% (06 Dec 2021 21:45) (96% - 100%)  Pulses palpable, cap refill <2 sec.     Jasmin Balbuena PA-C  pager [phone]

## 2021-12-07 ENCOUNTER — TRANSCRIPTION ENCOUNTER (OUTPATIENT)
Age: 61
End: 2021-12-07

## 2021-12-07 VITALS
HEART RATE: 78 BPM | OXYGEN SATURATION: 97 % | SYSTOLIC BLOOD PRESSURE: 145 MMHG | RESPIRATION RATE: 18 BRPM | DIASTOLIC BLOOD PRESSURE: 92 MMHG | TEMPERATURE: 97 F

## 2021-12-07 LAB
ANION GAP SERPL CALC-SCNC: 10 MMOL/L — SIGNIFICANT CHANGE UP (ref 7–14)
BASOPHILS # BLD AUTO: 0.02 K/UL — SIGNIFICANT CHANGE UP (ref 0–0.2)
BASOPHILS NFR BLD AUTO: 0.5 % — SIGNIFICANT CHANGE UP (ref 0–2)
BUN SERPL-MCNC: 19 MG/DL — SIGNIFICANT CHANGE UP (ref 7–23)
CALCIUM SERPL-MCNC: 9.5 MG/DL — SIGNIFICANT CHANGE UP (ref 8.4–10.5)
CHLORIDE SERPL-SCNC: 104 MMOL/L — SIGNIFICANT CHANGE UP (ref 98–107)
CO2 SERPL-SCNC: 26 MMOL/L — SIGNIFICANT CHANGE UP (ref 22–31)
CREAT SERPL-MCNC: 0.74 MG/DL — SIGNIFICANT CHANGE UP (ref 0.5–1.3)
EOSINOPHIL # BLD AUTO: 0.09 K/UL — SIGNIFICANT CHANGE UP (ref 0–0.5)
EOSINOPHIL NFR BLD AUTO: 2.2 % — SIGNIFICANT CHANGE UP (ref 0–6)
GLUCOSE SERPL-MCNC: 87 MG/DL — SIGNIFICANT CHANGE UP (ref 70–99)
HCT VFR BLD CALC: 39.9 % — SIGNIFICANT CHANGE UP (ref 34.5–45)
HGB BLD-MCNC: 12.5 G/DL — SIGNIFICANT CHANGE UP (ref 11.5–15.5)
IANC: 2.68 K/UL — SIGNIFICANT CHANGE UP (ref 1.5–8.5)
IMM GRANULOCYTES NFR BLD AUTO: 0.5 % — SIGNIFICANT CHANGE UP (ref 0–1.5)
LYMPHOCYTES # BLD AUTO: 0.92 K/UL — LOW (ref 1–3.3)
LYMPHOCYTES # BLD AUTO: 22.6 % — SIGNIFICANT CHANGE UP (ref 13–44)
MAGNESIUM SERPL-MCNC: 1.8 MG/DL — SIGNIFICANT CHANGE UP (ref 1.6–2.6)
MCHC RBC-ENTMCNC: 30.9 PG — SIGNIFICANT CHANGE UP (ref 27–34)
MCHC RBC-ENTMCNC: 31.3 GM/DL — LOW (ref 32–36)
MCV RBC AUTO: 98.5 FL — SIGNIFICANT CHANGE UP (ref 80–100)
MONOCYTES # BLD AUTO: 0.34 K/UL — SIGNIFICANT CHANGE UP (ref 0–0.9)
MONOCYTES NFR BLD AUTO: 8.4 % — SIGNIFICANT CHANGE UP (ref 2–14)
NEUTROPHILS # BLD AUTO: 2.68 K/UL — SIGNIFICANT CHANGE UP (ref 1.8–7.4)
NEUTROPHILS NFR BLD AUTO: 65.8 % — SIGNIFICANT CHANGE UP (ref 43–77)
NRBC # BLD: 0 /100 WBCS — SIGNIFICANT CHANGE UP
NRBC # FLD: 0 K/UL — SIGNIFICANT CHANGE UP
PHOSPHATE SERPL-MCNC: 4 MG/DL — SIGNIFICANT CHANGE UP (ref 2.5–4.5)
PLATELET # BLD AUTO: 156 K/UL — SIGNIFICANT CHANGE UP (ref 150–400)
POTASSIUM SERPL-MCNC: 4.1 MMOL/L — SIGNIFICANT CHANGE UP (ref 3.5–5.3)
POTASSIUM SERPL-SCNC: 4.1 MMOL/L — SIGNIFICANT CHANGE UP (ref 3.5–5.3)
RBC # BLD: 4.05 M/UL — SIGNIFICANT CHANGE UP (ref 3.8–5.2)
RBC # FLD: 12.4 % — SIGNIFICANT CHANGE UP (ref 10.3–14.5)
SODIUM SERPL-SCNC: 140 MMOL/L — SIGNIFICANT CHANGE UP (ref 135–145)
WBC # BLD: 4.07 K/UL — SIGNIFICANT CHANGE UP (ref 3.8–10.5)
WBC # FLD AUTO: 4.07 K/UL — SIGNIFICANT CHANGE UP (ref 3.8–10.5)

## 2021-12-07 PROCEDURE — 93306 TTE W/DOPPLER COMPLETE: CPT | Mod: 26

## 2021-12-07 RX ORDER — RIVAROXABAN 15 MG-20MG
2.5 KIT ORAL
Refills: 0 | Status: DISCONTINUED | OUTPATIENT
Start: 2021-12-07 | End: 2021-12-07

## 2021-12-07 RX ORDER — RIVAROXABAN 15 MG-20MG
20 KIT ORAL
Refills: 0 | Status: DISCONTINUED | OUTPATIENT
Start: 2021-12-07 | End: 2021-12-07

## 2021-12-07 NOTE — DISCHARGE NOTE NURSING/CASE MANAGEMENT/SOCIAL WORK - PATIENT PORTAL LINK FT
You can access the FollowMyHealth Patient Portal offered by [facility] by registering at the following website: [email or web address]. By joining [facility]’s FollowMyHealth portal, you will also be able to view your health information using other applications (apps) compatible with our system.

## 2021-12-07 NOTE — DISCHARGE NOTE NURSING/CASE MANAGEMENT/SOCIAL WORK - NSDCPEFALRISK_GEN_ALL_CORE
For information on Fall & Injury Prevention, visit: [email or web address] OR  [email or web address] OR  https://www.cdc.gov/steadi/patient.html

## 2021-12-07 NOTE — DISCHARGE NOTE PROVIDER - CARE PROVIDER_API CALL
Tc Keyes)  Cardiology  [address] [place] [address]  Phone: [phone]  Fax: [phone]  Follow Up Time: 2 weeks

## 2021-12-07 NOTE — DISCHARGE NOTE PROVIDER - NSDCMRMEDTOKEN_GEN_ALL_CORE_FT
atorvastatin 40 mg oral tablet: 1 tab(s) orally once a day  lisinopril 10 mg oral tablet: 1 tab(s) orally once a day  metoprolol succinate 100 mg oral capsule, extended release: 1 cap(s) orally once a day  Xarelto 20 mg oral tablet: 1 tab(s) orally once a day

## 2021-12-07 NOTE — DISCHARGE NOTE PROVIDER - NSDCCPCAREPLAN_GEN_ALL_CORE_FT
PRINCIPAL DISCHARGE DIAGNOSIS  Diagnosis: Exertional dyspnea  Assessment and Plan of Treatment: You came into the hospital because you were short of breath. Your cardiologst had you come in for a  nonobstructive CAD , mildly elevated pulmonary pressures.   The ultrasound of your heart showed _______      SECONDARY DISCHARGE DIAGNOSES  Diagnosis: Chronic atrial fibrillation  Assessment and Plan of Treatment: Continue your home xarelto    Diagnosis: Essential hypertension  Assessment and Plan of Treatment: Continue blood pressure medication regimen as directed. Monitor for any visual changes, headaches or dizziness.  Monitor blood pressure regularly.  Follow up with your primary care provider for further management for high blood pressure.    Diagnosis: History of TIA (transient ischemic attack)  Assessment and Plan of Treatment: continue home medications    Diagnosis: Hyperlipidemia  Assessment and Plan of Treatment: continue home medications    Diagnosis: Chest pain  Assessment and Plan of Treatment: You had chest pain , your cardiac enzymes were negative.     PRINCIPAL DISCHARGE DIAGNOSIS  Diagnosis: Exertional dyspnea  Assessment and Plan of Treatment: You came into the hospital because you were short of breath. Your cardiologst had you come in for a heart catheterizatin which showed   nonobstructive CAD , mildly elevated pulmonary pressures.   The ultrasound of your heart showed mild dysfunction of your left ventricle of your heart. Please follow up with  for further management.      SECONDARY DISCHARGE DIAGNOSES  Diagnosis: Chronic atrial fibrillation  Assessment and Plan of Treatment: Continue your home xarelto    Diagnosis: Essential hypertension  Assessment and Plan of Treatment: Continue blood pressure medication regimen as directed. Monitor for any visual changes, headaches or dizziness.  Monitor blood pressure regularly.  Follow up with your primary care provider for further management for high blood pressure.    Diagnosis: History of TIA (transient ischemic attack)  Assessment and Plan of Treatment: continue home medications    Diagnosis: Hyperlipidemia  Assessment and Plan of Treatment: continue home medications    Diagnosis: Chest pain  Assessment and Plan of Treatment: You had chest pain , your cardiac enzymes were negative.    Diagnosis: Status post left heart catheterization (LHC)  Assessment and Plan of Treatment: You had a heart catheterization done which showed mild coronary artery disease . Please follow up with  as an outpatient.

## 2021-12-07 NOTE — DISCHARGE NOTE PROVIDER - HOSPITAL COURSE
This is a 61F with history of AFib, HTN, and TIA who presents to the hospital with worsening SANDERS and intermittent chest pain with outpatient TTE showing decreased in cardiac function.     #Dyspnea on exertion.   -Patient w/ new onset SANDERS with intermittent chest pain and recent outpatient TTE with decreased cardiac function concerning for cardiac issues as a cause of her SANDERS  -troponins flat   -s/p cath on 12/6- showed nonobstructive CAD , mildly elevated pulmonary pressures.     #Chest pain.   - c/o intermittent chest pain, none currently, EKG non ischemic, trop low at 17  - trops flat, monitored on telemetry, has recent outpatient TTE with decreased cardiac function and being planned for an inpatient R and L cardiac catheterization  -repeat TTE inpatient showed _____________    #Chronic atrial fibrillation.    -[facility]  -rates controlled     #Essential hypertension.   - held lisinopril In setting of cath , resumed at d/c   - c/w metoprolol.    #Hyperlipidemia.   - continued with  atorvastatin.    #History of TIA (transient ischemic attack).   - Not on platelet therapy 2/2 PUD  - Restarted xarelto on 12/7 as per cardiology  -continued on statin    DVT ppx - lovenox     Patient seen and evaluated. Reviewed discharge medications with patient and attending. All new medications requiring new prescriptions were sent to the pharmacy of patient's choice. Reviewed need for prescription for previous home medications and new prescriptions sent if requested. Medically cleared/stable for discharge as per Dr. Keyes with appropriate follow up. Patient understands and agrees with plan of care.        This is a 61F with history of AFib, HTN, and TIA who presents to the hospital with worsening SANDERS and intermittent chest pain with outpatient TTE showing decreased in cardiac function.     #Dyspnea on exertion.   -Patient w/ new onset SANDERS with intermittent chest pain and recent outpatient TTE with decreased cardiac function concerning for cardiac issues as a cause of her SANDERS  -troponins flat   -s/p cath on 12/6- showed nonobstructive CAD , mildly elevated pulmonary pressures.     #Chest pain.   - c/o intermittent chest pain, none currently, EKG non ischemic, trop low at 17  - trops flat, monitored on telemetry, has recent outpatient TTE with decreased cardiac function and being planned for an inpatient R and L cardiac catheterization  -repeat TTE: EF:51% inpatient showed mild AR, mild dilated LA , mild global LV systolic dysfunction    #Chronic atrial fibrillation.    -xarelto, held for now for cardiac cath, [facility] socre [facility]  -rates controlled     #Essential hypertension.   - held lisinopril In setting of cath , resumed at d/c   - c/w metoprolol.    #Hyperlipidemia.   - continued with  atorvastatin.    #History of TIA (transient ischemic attack).   - Not on platelet therapy 2/2 PUD  - Restarted xarelto on 12/7 as per cardiology  -continued on statin    DVT ppx - lovenox     Patient seen and evaluated. Reviewed discharge medications with patient and attending. All new medications requiring new prescriptions were sent to the pharmacy of patient's choice. Reviewed need for prescription for previous home medications and new prescriptions sent if requested. Medically cleared/stable for discharge as per Dr. Keyes with appropriate follow up. Patient understands and agrees with plan of care.

## 2021-12-07 NOTE — PROVIDER CONTACT NOTE (MEDICATION) - ACTION/TREATMENT ORDERED:
Metoprolol held as per order. CELESTINA Balbuena made aware, no new orders. Will continue to monitor.

## 2022-04-22 ENCOUNTER — APPOINTMENT (OUTPATIENT)
Dept: SURGERY | Facility: CLINIC | Age: 62
End: 2022-04-22
Payer: COMMERCIAL

## 2022-04-22 VITALS
WEIGHT: 293 LBS | OXYGEN SATURATION: 96 % | SYSTOLIC BLOOD PRESSURE: 148 MMHG | BODY MASS INDEX: 41.95 KG/M2 | HEART RATE: 89 BPM | TEMPERATURE: 97.8 F | RESPIRATION RATE: 19 BRPM | HEIGHT: 70 IN | DIASTOLIC BLOOD PRESSURE: 89 MMHG

## 2022-04-22 DIAGNOSIS — I27.20 PULMONARY HYPERTENSION, UNSPECIFIED: ICD-10-CM

## 2022-04-22 DIAGNOSIS — I48.0 PAROXYSMAL ATRIAL FIBRILLATION: ICD-10-CM

## 2022-04-22 DIAGNOSIS — Z86.73 PERSONAL HISTORY OF TRANSIENT ISCHEMIC ATTACK (TIA), AND CEREBRAL INFARCTION W/OUT RESIDUAL DEFICITS: ICD-10-CM

## 2022-04-22 DIAGNOSIS — Z01.818 ENCOUNTER FOR OTHER PREPROCEDURAL EXAMINATION: ICD-10-CM

## 2022-04-22 DIAGNOSIS — I10 ESSENTIAL (PRIMARY) HYPERTENSION: ICD-10-CM

## 2022-04-22 PROCEDURE — 99205 OFFICE O/P NEW HI 60 MIN: CPT

## 2022-04-22 RX ORDER — ALPRAZOLAM 0.5 MG/1
0.5 TABLET ORAL
Qty: 1 | Refills: 0 | Status: DISCONTINUED | COMMUNITY
Start: 2017-12-20 | End: 2022-04-22

## 2022-04-22 RX ORDER — CIPROFLOXACIN HYDROCHLORIDE 750 MG/1
750 TABLET, FILM COATED ORAL
Qty: 20 | Refills: 0 | Status: DISCONTINUED | COMMUNITY
Start: 2017-07-11 | End: 2022-04-22

## 2022-04-22 RX ORDER — AZITHROMYCIN 500 MG/1
500 TABLET, FILM COATED ORAL
Qty: 3 | Refills: 0 | Status: DISCONTINUED | COMMUNITY
Start: 2017-10-17 | End: 2022-04-22

## 2022-04-22 RX ORDER — GENTAMICIN SULFATE 1 MG/G
0.1 CREAM TOPICAL
Qty: 30 | Refills: 0 | Status: DISCONTINUED | COMMUNITY
Start: 2017-08-23 | End: 2022-04-22

## 2022-04-22 RX ORDER — POLYETHYLENE GLYCOL 3350 17 G/17G
17 POWDER, FOR SOLUTION ORAL
Qty: 510 | Refills: 0 | Status: DISCONTINUED | COMMUNITY
Start: 2017-10-12 | End: 2022-04-22

## 2022-04-22 RX ORDER — TRIAMCINOLONE ACETONIDE 1 MG/G
0.1 CREAM TOPICAL
Qty: 80 | Refills: 0 | Status: DISCONTINUED | COMMUNITY
Start: 2017-09-30 | End: 2022-04-22

## 2022-04-22 RX ORDER — SILVER SULFADIAZINE 10 MG/G
1 CREAM TOPICAL
Qty: 50 | Refills: 0 | Status: DISCONTINUED | COMMUNITY
Start: 2017-10-17 | End: 2022-04-22

## 2022-04-22 RX ORDER — SODIUM SULFATE, POTASSIUM SULFATE, MAGNESIUM SULFATE 17.5; 3.13; 1.6 G/ML; G/ML; G/ML
17.5-3.13-1.6 SOLUTION, CONCENTRATE ORAL
Qty: 354 | Refills: 0 | Status: DISCONTINUED | COMMUNITY
Start: 2017-10-12 | End: 2022-04-22

## 2022-04-22 RX ORDER — GENTAMICIN SULFATE 1 MG/G
0.1 OINTMENT TOPICAL
Qty: 30 | Refills: 0 | Status: DISCONTINUED | COMMUNITY
Start: 2017-10-17 | End: 2022-04-22

## 2022-04-22 RX ORDER — FLUOCINONIDE 0.5 MG/G
0.05 CREAM TOPICAL
Qty: 60 | Refills: 0 | Status: DISCONTINUED | COMMUNITY
Start: 2017-07-11 | End: 2022-04-22

## 2022-04-22 RX ORDER — MUPIROCIN 20 MG/G
2 OINTMENT TOPICAL
Qty: 22 | Refills: 0 | Status: DISCONTINUED | COMMUNITY
Start: 2017-10-23 | End: 2022-04-22

## 2022-04-22 RX ORDER — NITROFURANTOIN (MONOHYDRATE/MACROCRYSTALS) 25; 75 MG/1; MG/1
100 CAPSULE ORAL
Qty: 14 | Refills: 0 | Status: DISCONTINUED | COMMUNITY
Start: 2017-10-08 | End: 2022-04-22

## 2022-04-22 NOTE — PLAN
[FreeTextEntry1] : I have discussed my impression and treatment plan with the patient.  All surgical options were discussed including non-surgical treatments.  The patient would like to proceed with laparoscopic sleeve gastrectomy.  \par \par Benefits and risks of the planned surgery were discussed in depth, particularly leak, bleeding, sepsis, fistula, GERD/esophagitis possibly requiring prolonged medical therapy and/or endoscopic surveillance, blood clots, dysphagia, malnutrition, weight regain, prolonged hospital stay and the remote possibility of death.   Also discussed was the importance of adhering to the recommended nutritional guidelines and supplements and attending regular follow-up.  I reviewed the critical importance of behavioral modification and follow-up in order to optimize outcomes and avoid complications. The patient was given the opportunity to ask pertinent questions and expressed good understanding of the aforementioned issues.\par \par Plan will be for laparoscopic sleeve gastrectomy after completion of:\par - medical weight management program\par - upper endoscopy\par - nutritional evaluation\par - medical, pulmonary and cardiac clearance -- will need to be off xarelto perioperatively\par - psychological evaluation

## 2022-04-22 NOTE — ASSESSMENT
[FreeTextEntry1] : 61-year-old female with longstanding history of morbid obesity (height 5 feet 10 inches, weight 359 pounds, BMI 51) with comorbidities of hypertension, hyperlipidemia, atrial fibrillation on Xarelto, history of TIA, and history of mild pulmonary hypertension who presents for evaluation for bariatric surgery.\par \par The patient has failed multiple prior attempts at weight loss and is now dealing with the side effects, risk factors, and poor quality of life associated with morbid obesity.  They would benefit from surgical weight loss as outlined in the NIH and  ASMBS consensus statements on bariatric surgery.  Surgical intervention is medically indicated.\par \par My impression is that the patient is a reasonable candidate for laparoscopic sleeve gastrectomy.\par

## 2022-04-22 NOTE — CONSULT LETTER
[Dear  ___] : Dear  [unfilled], [Consult Letter:] : I had the pleasure of evaluating your patient, [unfilled]. [Please see my note below.] : Please see my note below. [Consult Closing:] : Thank you very much for allowing me to participate in the care of this patient.  If you have any questions, please do not hesitate to contact me. [Sincerely,] : Sincerely, [FreeTextEntry2] : Dr. Lidia Cordova [FreeTextEntry3] : Carlos Clement MD, FACS, FASMBS\par [facility]\par [address]\[place] [address]\par tel. [phone]\par fax [phone]\par  \par

## 2022-04-22 NOTE — PHYSICAL EXAM
[Obese, well nourished, in no acute distress] : obese, well nourished, in no acute distress [Normal] : affect appropriate [de-identified] : nl respirations [de-identified] : Soft, nontender, nondistended. [de-identified] : + LE edema B/L

## 2022-04-22 NOTE — HISTORY OF PRESENT ILLNESS
[de-identified] : CHUCK  is a 61 year  female  here for a consultation for weight loss surgery, referred by Dr. Lidia Cordova.\par \par The patient has been struggling with obesity for most of her life.  She has attempted several diet and exercise programs without success.  She has tried weight watchers and other diet programs, is able to lose weight but always regains more.  The excess weight is starting to significantly affect her health and lifestyle.\par \par Medical history is significant for hypertension, hyperlipidemia, A. fib on Xarelto, history of TIA, mild pulmonary hypertension.\par Patient states she has a history of peptic ulcer disease with a bleeding ulcer decades ago, treated medically.  She has had no issues since then and is not on a PPI.  She denies any heartburn or reflux.\par Surgical history is significant for no prior abdominal surgeries.\par The patient denies prior history of blood clot or abnormal bleeding.\par The patient denies prior history of dysphagia.\par \par She works as a  and is originally from [place].\par

## 2022-05-23 ENCOUNTER — APPOINTMENT (OUTPATIENT)
Dept: SURGERY | Facility: CLINIC | Age: 62
End: 2022-05-23
Payer: COMMERCIAL

## 2022-05-23 VITALS
RESPIRATION RATE: 18 BRPM | HEIGHT: 70 IN | OXYGEN SATURATION: 97 % | DIASTOLIC BLOOD PRESSURE: 83 MMHG | WEIGHT: 293 LBS | SYSTOLIC BLOOD PRESSURE: 141 MMHG | HEART RATE: 96 BPM | TEMPERATURE: 97.8 F | BODY MASS INDEX: 41.95 KG/M2

## 2022-05-23 PROCEDURE — 99213 OFFICE O/P EST LOW 20 MIN: CPT

## 2022-05-23 NOTE — HISTORY OF PRESENT ILLNESS
[de-identified] : CHUCK is a 61 year old female presenting for a monthly weight check prior to laparoscopic sleeve gastrectomy.\par No changes in medical history reported.  Continuing to moderate portion sizes and make more healthful choices.  Increasing activity levels as much as possible. \par She has lost over 20 pounds after making significant efforts to reduce her carbohydrate and sugar intake.  She feels better and is very positive about the changes she is making.

## 2022-05-23 NOTE — ASSESSMENT
[FreeTextEntry1] : 61-year-old female with longstanding history of morbid obesity (BMI 48) with comorbidities of hypertension, hyperlipidemia, atrial fibrillation on Xarelto, history of TIA, and history of mild pulmonary hypertension who presents for continuing medical weight management in preparation for planned bariatric surgery.  She would like to proceed with laparoscopic sleeve gastrectomy.

## 2022-05-23 NOTE — PHYSICAL EXAM
[Obese, well nourished, in no acute distress] : obese, well nourished, in no acute distress [Normal] : affect appropriate [de-identified] : nl respirations [de-identified] : Soft, nontender, nondistended. [de-identified] : + LE edema B/L

## 2022-06-27 ENCOUNTER — APPOINTMENT (OUTPATIENT)
Dept: SURGERY | Facility: CLINIC | Age: 62
End: 2022-06-27
Payer: COMMERCIAL

## 2022-06-27 VITALS
BODY MASS INDEX: 41.95 KG/M2 | RESPIRATION RATE: 18 BRPM | WEIGHT: 293 LBS | OXYGEN SATURATION: 97 % | SYSTOLIC BLOOD PRESSURE: 138 MMHG | HEIGHT: 70 IN | HEART RATE: 85 BPM | DIASTOLIC BLOOD PRESSURE: 83 MMHG | TEMPERATURE: 97.2 F

## 2022-06-27 PROCEDURE — 99213 OFFICE O/P EST LOW 20 MIN: CPT

## 2022-06-27 NOTE — ASSESSMENT
[FreeTextEntry1] : 61-year-old female with longstanding history of morbid obesity (BMI 48) with comorbidities of hypertension, hyperlipidemia, atrial fibrillation on Xarelto, history of TIA, and history of mild pulmonary hypertension who presents for continuing medical weight management in preparation for planned bariatric surgery. She would like to proceed with laparoscopic sleeve gastrectomy. \par \par

## 2022-06-27 NOTE — HISTORY OF PRESENT ILLNESS
[de-identified] : CHUCK is a 61 year old female presenting for a monthly weight check prior to laparoscopic sleeve gastrectomy.\par  [de-identified] : No changes in medical history reported.\par She is today at 331Lbs from 333 lbs in the previous visit\par Continuing to moderate portion sizes and make more healthful choices. Increasing activity levels as much as possible. \par She has lost over 22 pounds after making significant efforts to reduce her carbohydrate and sugar intake. \par She feels better and is very positive about the changes she is making. \par She is due to see the Pulmonologist today for the Sleep Apnea Studies.\par She is able to recollect the Surgery,Risks,Benefits and expectations.\par

## 2022-07-25 ENCOUNTER — APPOINTMENT (OUTPATIENT)
Dept: SURGERY | Facility: CLINIC | Age: 62
End: 2022-07-25

## 2022-07-25 VITALS
SYSTOLIC BLOOD PRESSURE: 125 MMHG | HEIGHT: 70 IN | OXYGEN SATURATION: 97 % | RESPIRATION RATE: 18 BRPM | DIASTOLIC BLOOD PRESSURE: 85 MMHG | BODY MASS INDEX: 41.95 KG/M2 | WEIGHT: 293 LBS | HEART RATE: 87 BPM

## 2022-07-25 PROCEDURE — 99212 OFFICE O/P EST SF 10 MIN: CPT

## 2022-07-25 NOTE — HISTORY OF PRESENT ILLNESS
[de-identified] : CHUCK is a 61 year old female presenting for a monthly weight check prior to laparoscopic sleeve gastrectomy.\par No changes in medical history reported.  Continuing to moderate portion sizes and make more healthful choices.  Increasing activity levels as much as possible. \par Had sleep apnea study completed, showing borderline sleep apnea.  She is currently waiting for her CPAP machine.

## 2022-08-29 ENCOUNTER — APPOINTMENT (OUTPATIENT)
Dept: SURGERY | Facility: CLINIC | Age: 62
End: 2022-08-29

## 2022-08-29 VITALS
BODY MASS INDEX: 41.95 KG/M2 | DIASTOLIC BLOOD PRESSURE: 84 MMHG | HEIGHT: 70 IN | SYSTOLIC BLOOD PRESSURE: 128 MMHG | WEIGHT: 293 LBS | TEMPERATURE: 97.5 F | OXYGEN SATURATION: 98 % | RESPIRATION RATE: 18 BRPM | HEART RATE: 84 BPM

## 2022-08-29 PROCEDURE — 99212 OFFICE O/P EST SF 10 MIN: CPT

## 2022-08-29 NOTE — HISTORY OF PRESENT ILLNESS
[de-identified] : CHUCK is a 61 year old female presenting for a monthly weight check prior to laparoscopic sleeve gastrectomy.\par No changes in medical history reported.  Continuing to moderate portion sizes and make more healthful choices.  Increasing activity levels as much as possible. \lili Gained a little bit of weight during her recent trip [place].  She says she is back to maintaining more healthy eating regimen.\lili Is scheduled to have endoscopy in the coming weeks.

## 2022-09-30 ENCOUNTER — APPOINTMENT (OUTPATIENT)
Dept: SURGERY | Facility: CLINIC | Age: 62
End: 2022-09-30

## 2022-09-30 VITALS
BODY MASS INDEX: 41.95 KG/M2 | HEART RATE: 84 BPM | TEMPERATURE: 96.4 F | WEIGHT: 293 LBS | DIASTOLIC BLOOD PRESSURE: 68 MMHG | HEIGHT: 70 IN | SYSTOLIC BLOOD PRESSURE: 143 MMHG | RESPIRATION RATE: 17 BRPM | OXYGEN SATURATION: 98 %

## 2022-09-30 DIAGNOSIS — E66.01 MORBID (SEVERE) OBESITY DUE TO EXCESS CALORIES: ICD-10-CM

## 2022-09-30 PROCEDURE — 99212 OFFICE O/P EST SF 10 MIN: CPT

## 2022-09-30 NOTE — PLAN
[FreeTextEntry1] : Adequate and restful sleep are important to reduce cravings.\par Portion control will help reduce reflux symptoms.  \par Discussed adding low-sugar protein shakes as a meal replacement once per day in preparation for bariatric diet.\par \par The patient expressed good understanding and is committed to learning and incorporating diet and exercise modifications to optimize success after bariatric surgery. \par \par F/u 1 month

## 2022-09-30 NOTE — HISTORY OF PRESENT ILLNESS
[de-identified] : CHUCK is a 61 year old female presenting for a monthly weight check prior to bariatric surgery. \par No changes in medical history reported.  Continuing to moderate portion sizes and make more healthful choices.  Increasing activity levels as much as possible. \par

## 2022-10-24 NOTE — DISCHARGE NOTE PROVIDER - NSDCQMERRANDS_GEN_ALL_CORE
Occupational Therapy      Patient Name:  David Barrios   MRN:  [id number]    Pt not seen this date. Pt remains appropriate for continued OT services and able to reach POC in subsequent visits.   10/24/2022   No

## 2022-10-31 ENCOUNTER — APPOINTMENT (OUTPATIENT)
Dept: SURGERY | Facility: CLINIC | Age: 62
End: 2022-10-31

## 2022-10-31 VITALS
TEMPERATURE: 97.4 F | RESPIRATION RATE: 17 BRPM | HEART RATE: 79 BPM | DIASTOLIC BLOOD PRESSURE: 81 MMHG | SYSTOLIC BLOOD PRESSURE: 126 MMHG | BODY MASS INDEX: 41.95 KG/M2 | OXYGEN SATURATION: 98 % | HEIGHT: 70 IN | WEIGHT: 293 LBS

## 2022-10-31 DIAGNOSIS — E66.01 MORBID (SEVERE) OBESITY DUE TO EXCESS CALORIES: ICD-10-CM

## 2022-10-31 PROCEDURE — 99213 OFFICE O/P EST LOW 20 MIN: CPT

## 2022-10-31 NOTE — HISTORY OF PRESENT ILLNESS
[de-identified] : CHUCK is a 61 year old female presenting for a monthly weight check prior to bariatric surgery. \par No changes in medical history reported.  Continuing to moderate portion sizes and make more healthful choices.  Increasing activity levels as much as possible. \par EGD and colonoscopy scheduled for November, as are cardiology appointments.

## 2022-10-31 NOTE — ASSESSMENT
[FreeTextEntry1] : 62-year-old female with longstanding history of morbid obesity (BMI 48) with comorbidities of hypertension, hyperlipidemia, atrial fibrillation on Xarelto, history of TIA, and history of mild pulmonary hypertension who presents for continuing medical weight management in preparation for planned bariatric surgery. She would like to proceed with laparoscopic sleeve gastrectomy. \par \par

## 2022-11-28 ENCOUNTER — RESULT REVIEW (OUTPATIENT)
Age: 62
End: 2022-11-28

## 2022-12-14 ENCOUNTER — APPOINTMENT (OUTPATIENT)
Dept: VASCULAR SURGERY | Facility: CLINIC | Age: 62
End: 2022-12-14

## 2022-12-14 VITALS
DIASTOLIC BLOOD PRESSURE: 82 MMHG | HEART RATE: 98 BPM | BODY MASS INDEX: 41.95 KG/M2 | WEIGHT: 293 LBS | HEIGHT: 70 IN | SYSTOLIC BLOOD PRESSURE: 123 MMHG | TEMPERATURE: 97.6 F

## 2022-12-14 PROCEDURE — 99214 OFFICE O/P EST MOD 30 MIN: CPT

## 2022-12-14 PROCEDURE — 93923 UPR/LXTR ART STDY 3+ LVLS: CPT

## 2022-12-14 PROCEDURE — 93970 EXTREMITY STUDY: CPT

## 2022-12-14 NOTE — ASSESSMENT
[Arterial/Venous Disease] : arterial/venous disease [Medication Management] : medication management [Other: _____] : [unfilled] [Ulcer Care] : ulcer care [FreeTextEntry1] : Ms. CHUCK CORDERO is a 62 year with persistent bilateral lower extremity venous insufficiency, CEAP classification C6 which is unresponsive to at least 3 months of compression stockings 20-30 mmHg, leg elevation, exercise and over the counter pain medication (Ibuprofen).  Patient has complaints of worsening  leg discomfort with swelling, fatigue, heaviness, achiness, and cramping.  The patient’s symptoms interfere with their ADL’s, such as their ability to work and exercise. Patient has intact pulses in both legs without evidence of arterial insufficiency. \par \par  \par \par Treatment is indicated for symptomatic venous reflux disease with symptoms which is refractory to conservative therapy. Venous duplex study demonstrates bilateral lower extremity venous insufficiency. The need for definitive effective treatment is based on severe, interfering and worsening reflux symptoms with evidence of venous insufficiency on venous ultrasound.\par \par  \par \par Patient is a candidate for endovenous ablation treatment of bilateral AGSV and GSV varithena\par \par The risks, benefits and alternatives treatment versus continued conservative management were discussed with the patient.  Patient chooses endovenous ablation treatments. Treatment plan to be scheduled.\par

## 2022-12-14 NOTE — PHYSICAL EXAM
[2+] : right 2+ [Varicose Veins Of Lower Extremities] : present [] : bilaterally [Alert] : alert [Oriented to Person] : oriented to person [Oriented to Place] : oriented to place [Oriented to Time] : oriented to time [Calm] : calm [1+] : left 1+ [Ankle Swelling (On Exam)] : present [Ankle Swelling Bilaterally] : bilaterally  [Ankle Swelling On The Right] : mild [Ankle Swelling On The Left] : moderate [Skin Ulcer] : ulcer [Stool Sample Taken] : No stool obtained  on rectal exam [de-identified] : well in NAD  [FreeTextEntry1] : LE vein findings:\par Varicosities (spider, reticular, varicose)  bilateral\par Edema bilateral\par Skin changes dry, flaky skin, stasis dermatitis, hyperpigmentation in the gator area, and lymphorrhea (weeping)\par Right medial malleolus ulcer 2.3 cm length x 1.1 width x 2 mm depth\par Left medial malleolus ulcer 4 cm length x 4.5 cm width x 2 mm depth\par Left 3rd toe wound/ulcer 0.8 cm length x 0.6 cm width x 2 mm depth\par CEAP classification C6\par Palpable DP pulses bilaterally\par \par \par  [de-identified] : ARLEENL [de-identified] : bilateral medial malleolus and left 3rd toe [de-identified] : Cooperative

## 2022-12-14 NOTE — HISTORY OF PRESENT ILLNESS
[FreeTextEntry1] : 61 y/o f w/ known venous insufficiency, varicose veins previous venous ulcers which healed s/p bilateral RFAs (Right GSV and SSV RFA and Left GSV RFA). She presents today w/ new complaint of 2 new small wounds on the right foot and calf. Denies trauma or injury. Previously it was recommended she wear 30-40mmhg stockings, however she is wearing 20-30mmhg. She works as a  and stands for prolonged periods of time.  [de-identified] : 12/14/22\par Mrs. Figueroa, 62 yr old female, with hx of chronic venous insufficiency and venous stasis ulcers s/p bilateral GSV RFA and R SSV RFA in 2018 and 2019 c/o of bilateral medial ankle wounds that started about 2 weeks ago. Pt denies injuries or broken bones. Pt applied bilateral unna boot herself when she noticed increased hyperpigmentation around her medial ankles. 2-3 days after initiating bilateral unna boot, the patient noticed the skin opened up in both medial ankles. Pt is also c/o of wound on left 3rd toe that started about a month ago. She denies any injuries to the site. She has been applying hydrogen peroxide and antibiotic ointment to the wound.\par \par Pt denies claudication or rest pain. Pt works as a  and stands on her feet for about 10 hrs a day. She wears knee high 20-30 compression stockings daily for the past 3 months, instead of 30-40 mm hg as previously recommended. She elevates her legs at home at the end of the day.\par \par PMH: afib, chronic venous insufficiency, left ankle fracture, htn, hld, TIA 10 yrs ago\par Meds: Xarelto 20 mg daily, Lisinopril, Atorvastatin, Metoprolol\par

## 2022-12-14 NOTE — REASON FOR VISIT
[Follow-Up: _____] : a [unfilled] follow-up visit [FreeTextEntry1] : bilateral medial ankle wounds and left 3rd toe wound

## 2022-12-14 NOTE — DATA REVIEWED
[FreeTextEntry1] : 12/16/20 Venous Doppler: Right leg ASV reflux but ASV is short and tortuous\par \par 12/14/22 bilateral VLE\par Right: neg dvt/svt; + reflux CFV, AGSV, GSV calf, distal calf,  and VV\par Left: neg dvt/svt + reflux CFV, AGSV, GSV calf, distal calf,  and VV\par \par 12/14/22 PVR/KARLOS\par Right: 1.34\par Left: 1.32

## 2022-12-14 NOTE — PROCEDURE
[FreeTextEntry1] : Evens  Unna boot applied from toes to knee  w/o complications\par Pt misa procedure well\par Pt is not allergic to the unna boot\par

## 2022-12-21 ENCOUNTER — APPOINTMENT (OUTPATIENT)
Dept: VASCULAR SURGERY | Facility: CLINIC | Age: 62
End: 2022-12-21

## 2022-12-21 VITALS
TEMPERATURE: 97.5 F | HEART RATE: 96 BPM | BODY MASS INDEX: 41.95 KG/M2 | SYSTOLIC BLOOD PRESSURE: 139 MMHG | HEIGHT: 70 IN | DIASTOLIC BLOOD PRESSURE: 88 MMHG | WEIGHT: 293 LBS

## 2022-12-21 PROCEDURE — 99212 OFFICE O/P EST SF 10 MIN: CPT | Mod: 25

## 2022-12-21 PROCEDURE — 29580 STRAPPING UNNA BOOT: CPT | Mod: 50

## 2022-12-21 NOTE — HISTORY OF PRESENT ILLNESS
[FreeTextEntry1] : Mrs. Figueora, 62 yr old female, with hx of chronic venous insufficiency and venous stasis ulcers s/p bilateral GSV RFA and R SSV RFA in 2018 and 2019 c/o of bilateral medial ankle wounds that started about 2 weeks ago. Pt denies injuries or broken bones. Pt applied bilateral unna boot herself when she noticed increased hyperpigmentation around her medial ankles. 2-3 days after initiating bilateral unna boot, the patient noticed the skin opened up in both medial ankles. Pt is also c/o of wound on left 3rd toe that started about a month ago. She denies any injuries to the site. She has been applying hydrogen peroxide and antibiotic ointment to the wound.\par \par Pt denies claudication or rest pain. Pt works as a  and stands on her feet for about 10 hrs a day. She wears knee high 20-30 compression stockings daily for the past 3 months, instead of 30-40 mm hg as previously recommended. She elevates her legs at home at the end of the day.\par \par PMH: afib, chronic venous insufficiency, left ankle fracture, htn, hld, TIA 10 yrs ago\par Meds: Xarelto 20 mg daily, Lisinopril, Atorvastatin, Metoprolol [de-identified] : pt is here for bilateral unna boot change\par pt c/o of pain in bilateral legs from the unna boot.\par pt states unna boot being too tight\par pt states she noticed her leg swelling improved with the unna boot\par pt states she takes Advil for the pain, but not everyday \par \par \par

## 2022-12-21 NOTE — ASSESSMENT
[Arterial/Venous Disease] : arterial/venous disease [FreeTextEntry1] : Impression - venous stasis ulcers, chronic venous insufficiency\par \par Plan\par Conservative medical management - leg elevation, exercise regimen, skin hygiene, weight loss, diet control, knee high compression stockings 30-40 mm hg after ulcers are healed from unna boot\par Apply betadine to left 3rd toe blister daily\par Advised pt to take Tylenol prn for pain instead of Advil to decrease bleeding since pt is already taking Xarelto for afib\par return to office in 1 week for bilateral unna boot

## 2022-12-21 NOTE — PHYSICAL EXAM
[2+] : right 2+ [1+] : left 1+ [Ankle Swelling (On Exam)] : present [Ankle Swelling Bilaterally] : bilaterally  [Ankle Swelling On The Right] : mild [Varicose Veins Of Lower Extremities] : bilaterally [] : bilaterally [Ankle Swelling On The Left] : moderate [Skin Ulcer] : ulcer [Alert] : alert [Oriented to Person] : oriented to person [Oriented to Place] : oriented to place [Oriented to Time] : oriented to time [Calm] : calm [Stool Sample Taken] : No stool obtained  on rectal exam [de-identified] : well in NAD  [de-identified] : no resp distress [FreeTextEntry1] : LE vein findings:\par Varicosities (spider, reticular, varicose)  bilateral\par Edema bilateral\par Skin changes dry, flaky skin, stasis dermatitis and hyperpigmentation in the gator area\par Right medial malleolus ulcer 2 cm length x 1.1 width x 2 mm depth\par Left medial malleolus ulcer 4 cm length x 4.3 cm width x 2 mm depth\par Left 3rd toe wound/ulcer 0.6 cm length x 0.6 cm width x 2 mm depth\par CEAP classification C6\par Palpable DP pulses bilaterally\par \par \par  [de-identified] : ARLEENL [de-identified] : bilateral medial malleolus ulcer and left 3rd toe blister [de-identified] : Cooperative

## 2022-12-21 NOTE — PROCEDURE
[FreeTextEntry1] : Bilateral Unna boot applied from toes to knee  w/o complications\par Pt misa procedure well\par Pt is not allergic to the unna boot\par

## 2022-12-28 ENCOUNTER — APPOINTMENT (OUTPATIENT)
Dept: VASCULAR SURGERY | Facility: CLINIC | Age: 62
End: 2022-12-28

## 2022-12-28 VITALS
HEART RATE: 83 BPM | SYSTOLIC BLOOD PRESSURE: 152 MMHG | BODY MASS INDEX: 41.95 KG/M2 | DIASTOLIC BLOOD PRESSURE: 97 MMHG | WEIGHT: 293 LBS | TEMPERATURE: 97.5 F | HEIGHT: 70 IN

## 2022-12-28 DIAGNOSIS — I83.893 VARICOSE VEINS OF BILATERAL LOWER EXTREMITIES WITH OTHER COMPLICATIONS: ICD-10-CM

## 2022-12-28 PROCEDURE — 29580 STRAPPING UNNA BOOT: CPT | Mod: 50

## 2022-12-28 PROCEDURE — 99213 OFFICE O/P EST LOW 20 MIN: CPT | Mod: 25

## 2022-12-28 NOTE — HISTORY OF PRESENT ILLNESS
[FreeTextEntry1] : Mrs. Figueroa, 62 yr old female, with hx of chronic venous insufficiency and venous stasis ulcers s/p bilateral GSV RFA and R SSV RFA in 2018 and 2019 c/o of bilateral medial ankle wounds that started about 2 weeks ago. Pt denies injuries or broken bones. Pt applied bilateral unna boot herself when she noticed increased hyperpigmentation around her medial ankles. 2-3 days after initiating bilateral unna boot, the patient noticed the skin opened up in both medial ankles. Pt is also c/o of wound on left 3rd toe that started about a month ago. She denies any injuries to the site. She has been applying hydrogen peroxide and antibiotic ointment to the wound.\par \par Pt denies claudication or rest pain. Pt works as a  and stands on her feet for about 10 hrs a day. She wears knee high 20-30 compression stockings daily for the past 3 months, instead of 30-40 mm hg as previously recommended. She elevates her legs at home at the end of the day.\par \par PMH: afib, chronic venous insufficiency, left ankle fracture, htn, hld, TIA 10 yrs ago\par Meds: Xarelto 20 mg daily, Lisinopril, Atorvastatin, Metoprolol [de-identified] : pt is here for bilateral unna boot change\par pt c/o of pain in bilateral legs from the unna boot.\par bilateral lower extremity discomfort remains\par pt states she removed the unna boot on Saturday to shower\par pt reapplied bilateral unna boot after the shower\par in the shower, pt scrubbed her skin too hard and bled a little by left medial malleolus wound\par skin on left medial malleolus wound appears to be red, darken, and raw looking\par pt reports light brown drainage coming from left medial malleolus wound\par left 3rd toe wound/ulcer is dry and scabbed over. pt has been putting betadine daily over left 3rd toe wound\par pt states she noticed her leg swelling improved with the unna boot\par pt states she takes Tylenol for the pain, but not everyday \par \par \par

## 2022-12-28 NOTE — PHYSICAL EXAM
[2+] : right 2+ [1+] : left 1+ [Ankle Swelling (On Exam)] : present [Ankle Swelling Bilaterally] : bilaterally  [Ankle Swelling On The Right] : mild [Varicose Veins Of Lower Extremities] : bilaterally [] : bilaterally [Ankle Swelling On The Left] : moderate [Skin Ulcer] : ulcer [Alert] : alert [Oriented to Person] : oriented to person [Oriented to Place] : oriented to place [Oriented to Time] : oriented to time [Calm] : calm [de-identified] : well in NAD  [de-identified] : no resp distress [FreeTextEntry1] : LE vein findings:\par Varicosities (spider, reticular, varicose)  bilateral\par Edema bilateral\par Skin changes dry, flaky skin, stasis dermatitis and hyperpigmentation in the gator area\par Right medial malleolus ulcer 2 cm length x 1 cm width x 2 mm depth\par Right medial malleolus ulcer 1 cm length x 1 cm width x 2 mm depth\par Left medial malleolus ulcer 1.9 cm length x 1 cm width x 2 mm depth\par CEAP classification C6\par Palpable DP pulses bilaterally\par \par \par  [de-identified] : ARLEENL [de-identified] : bilateral medial malleolus ulcer  [de-identified] : Cooperative  normal...

## 2022-12-28 NOTE — PROCEDURE
[FreeTextEntry1] : Bilateral Unna boot applied from toes to knee  w/o complications\par polymein applied over left medial malleolus\par Pt misa procedure well\par Pt is not allergic to the unna boot\par

## 2022-12-28 NOTE — ASSESSMENT
[Arterial/Venous Disease] : arterial/venous disease [FreeTextEntry1] : Impression - venous stasis ulcers, chronic venous insufficiency\par \par Plan\par Conservative medical management - leg elevation, exercise regimen, skin hygiene, weight loss, diet control, knee high compression stockings 30-40 mm hg after ulcers are healed from unna boot\par Advised pt to take Tylenol prn for pain instead of Advil to decrease bleeding since pt is already taking Xarelto for afib\par Advised pt when she showers to not scrub her skin around the ulcers too hard since her skin is very thin\par return to office in 1 week for bilateral unna boot

## 2023-01-03 RX ORDER — ALPRAZOLAM 0.5 MG/1
0.5 TABLET ORAL
Qty: 1 | Refills: 0 | Status: COMPLETED | COMMUNITY
Start: 2023-01-03 | End: 2023-01-09

## 2023-01-04 ENCOUNTER — APPOINTMENT (OUTPATIENT)
Dept: VASCULAR SURGERY | Facility: CLINIC | Age: 63
End: 2023-01-04
Payer: COMMERCIAL

## 2023-01-04 VITALS — DIASTOLIC BLOOD PRESSURE: 79 MMHG | SYSTOLIC BLOOD PRESSURE: 129 MMHG | HEART RATE: 71 BPM

## 2023-01-04 VITALS — WEIGHT: 293 LBS | HEIGHT: 70 IN | BODY MASS INDEX: 41.95 KG/M2 | TEMPERATURE: 98 F

## 2023-01-04 PROCEDURE — 99213 OFFICE O/P EST LOW 20 MIN: CPT | Mod: 25

## 2023-01-04 PROCEDURE — 29580 STRAPPING UNNA BOOT: CPT | Mod: 50

## 2023-01-04 NOTE — ASSESSMENT
[Arterial/Venous Disease] : arterial/venous disease [Ulcer Care] : ulcer care [FreeTextEntry1] : Impression - venous stasis ulcers, chronic venous insufficiency\par \par Plan\par Conservative medical management - leg elevation, exercise regimen, skin hygiene, weight loss, diet control, knee high compression stockings 30-40 mm hg after ulcers are healed from unna boot\par Advised pt to take Tylenol prn for pain instead of Advil to decrease bleeding since pt is already taking Xarelto for afib\par Advised pt when she showers to not scrub her skin around the ulcers too hard since her skin is very thin\par Right AGSV varithena procedure next Monday followed by ultrasound on Tues 1/17/22\par Office visit after ultrasound to evaluate bilateral lower extremities on Tues 1/17/22

## 2023-01-04 NOTE — HISTORY OF PRESENT ILLNESS
[FreeTextEntry1] : Mrs. Figueroa, 62 yr old female, with hx of chronic venous insufficiency and venous stasis ulcers s/p bilateral GSV RFA and R SSV RFA in 2018 and 2019 c/o of bilateral medial ankle wounds that started about 2 weeks ago. Pt denies injuries or broken bones. Pt applied bilateral unna boot herself when she noticed increased hyperpigmentation around her medial ankles. 2-3 days after initiating bilateral unna boot, the patient noticed the skin opened up in both medial ankles. Pt is also c/o of wound on left 3rd toe that started about a month ago. She denies any injuries to the site. She has been applying hydrogen peroxide and antibiotic ointment to the wound.\par \par Pt denies claudication or rest pain. Pt works as a  and stands on her feet for about 10 hrs a day. She wears knee high 20-30 compression stockings daily for the past 3 months, instead of 30-40 mm hg as previously recommended. She elevates her legs at home at the end of the day.\par \par PMH: afib, chronic venous insufficiency, left ankle fracture, htn, hld, TIA 10 yrs ago\par Meds: Xarelto 20 mg daily, Lisinopril, Atorvastatin, Metoprolol [de-identified] : pt is here for bilateral unna boot change\par bilateral lower extremity discomfort remains\par pt complains of stinging sensation and mild pain around bilateral leg ulcers\par pain is worse when standing and sitting for too long\par pt takes tylenol for the pain. Tylenol has been helping\par pt states she removed the unna boot on Saturday to shower\par pt reapplied bilateral unna boot on her own after the shower\par skin on left medial malleolus wound still appears to be red, darken, and raw looking from scrubbing it in the shower 2 Saturdays ago.\par ulcers on left medial malleolus healing well\par ulcers on right medial malleolus are slowly healing and they have remain the same size\par pt reports light brown drainage coming from left medial malleolus wound\par left 3rd toe wound/ulcer is dry and scabbed over. pt has been putting betadine daily over left 3rd toe wound\par pt states she noticed her leg swelling improved with the unna boot\par no new wounds/ulcers\par \par \par \par

## 2023-01-04 NOTE — PHYSICAL EXAM
[2+] : right 2+ [1+] : left 1+ [Ankle Swelling (On Exam)] : present [Ankle Swelling Bilaterally] : bilaterally  [Ankle Swelling On The Right] : mild [Varicose Veins Of Lower Extremities] : bilaterally [] : bilaterally [Ankle Swelling On The Left] : moderate [Skin Ulcer] : ulcer [Alert] : alert [Oriented to Person] : oriented to person [Oriented to Place] : oriented to place [Oriented to Time] : oriented to time [Calm] : calm [de-identified] : well in NAD  [de-identified] : no resp distress [FreeTextEntry1] : LE vein findings:\par Varicosities (spider, reticular, varicose)  bilateral\par Edema bilateral\par Skin changes dry, flaky skin, stasis dermatitis and hyperpigmentation in the gator area\par Right medial malleolus ulcer 2 cm length x 1 cm width x 2 mm depth\par Right medial malleolus ulcer 0.8 cm length x 0.8 cm width x 2 mm depth\par Left medial malleolus ulcer 1 cm length x 1 cm width x 2 mm depth\par CEAP classification C6\par Palpable DP pulses bilaterally\par \par \par  [de-identified] : ARLEENL [de-identified] : bilateral medial malleolus ulcer  [de-identified] : Cooperative

## 2023-01-09 ENCOUNTER — APPOINTMENT (OUTPATIENT)
Dept: VASCULAR SURGERY | Facility: CLINIC | Age: 63
End: 2023-01-09
Payer: COMMERCIAL

## 2023-01-09 PROCEDURE — 29580 STRAPPING UNNA BOOT: CPT | Mod: 50,59

## 2023-01-09 PROCEDURE — 36465Z: CUSTOM | Mod: RT

## 2023-01-09 NOTE — PROCEDURE
[FreeTextEntry1] :  Ultrasound guided microfoam chemical ablation with Varithena® of the right anterior great saphenous vein\par bilateral unnaboots  [FreeTextEntry3] : Procedural safety checklist and time out completed:\par Confirmed patient identification (Patient Name, , and/or medical record number including when possible affirmation by patient or parent/family/other).\par Confirmed procedure with the patient. Consent present, accurate and signed. \par Confirmed special equipment and supplies are present.\par Sterility confirmed. Position verified. \par Site/ side is marked and visible and confirmed. \par Procedure confirmed by consent. Accurate consent including side and site.\par Review of medical records, including venous ultrasound, noting correct procedure including site and side.\par MD/PA verifies presence and review of imaging studies and or written report of imaging studies.\par Agreement on the procedure to be performed\par Time out completed.\par All of the above has been confirmed by the team.\par All patient-specific concerns have been addressed. \par \par Indication: right lower extremity varicose veins with inflammation, leg pain, leg swelling, and leg cramping.  Venous insufficiency/ reflux.\par \par Procedure: Ultrasound guided microfoam chemical ablation with Varithena® of the right anterior great saphenous vein\par 	\par [Ms. CHUCK CORDERO is a 62 year old F with a history of  right lower extremity varicose veins and venous insufficiency previously seen in the office.  Ultrasound examination demonstrated venous insufficiency. A trial of compression stockings, exercise, elevation, and pain medication was attempted without relief and definitive treatment with radiofrequency ablation was offered. \par \par The patient has come for ultrasound guided microfoam chemical ablation with Varithena® of the right anterior great saphenous vein\par I have discussed the risks of the procedure at length with the patient. The risks discussed were inclusive of but not limited to infection, irritation at the site of infiltration of local anesthesia, and also rare risk of deep venous thrombosis and pulmonary emboli. The patient agrees to proceed with the procedure. \par The patient was escorted into the procedure room and a time out called.\par \par The patient was placed on the procedure room table and was evaluated in reverse Trendelenburg position. The leg was prepped and sterile drapes applied. 1.0% lidocaine was administered at the chosen access site for local anesthesia. The vein was accessed using a ____micro-puncture needle followed by a guide wire through the punctured needle and dilator sheath, standard IV catheters, or butterfly needle under advanced ultrasound guidance. Vein access was established and confirmed by aspiration of dark low pressure blood. After gaining access, the leg was positioned at 45 degrees of elevation in relationship to the torso. \par The Varithena canister was primed and purged as required by the instructions. A _8_mL aliquot was drawn into a sterile syringe then attached to the access device. \par \par Varithena was slowly administered at 1.0 cc/second with close observation by ultrasound of its course in the GSV. When delivery arrived at approximately 3-5 cm from the SFJ, the AGSV was compressed to prevent flow into the common femoral vein. Further administration of Varithena was stopped at this point and the treated AGSV was observed for spasm with ultrasound over a period of 3-5 minutes. \par \par After the administration of Varithena the patient was asked to dorsiflex the ankle to limit flow of foam into perforating veins. Once appropriate spasm had been confirmed in the treated veins, the access device was removed from the leg and light pressure was applied to the puncture site for hemostasis.\par \par The common femoral and deep superficial veins were then evaluated for flow and compressibility prior to dressing placement. The lower extremity was kept elevated at 45 degree angle and bilateral unnaboots were applied The leg was lowered only after compression had been applied. \par \par The patient ambulated 10 minutes under supervision and was without concerns at time of release. The patient was instructed to take an anti-inflammatory medicine as needed and to follow up for duplex scan of treated vein. \par \par Patient tolerated procedure, was given post-procedure instructions and a follow-up appt scheduled.\par Post-care instructions include walking, wearing compression during the day, taking off only to shower and then reapplying for two weeks, advising patient to keep post-treatment bandages in place and dry for 48 hours, avoid extended periods of inactivity, avoid heavy exercise for one week, to walk daily for 10 minutes over the next month. The patient was instructed to take an anti-inflammatory medicine as needed.\par LOT#  [id number]\par EXP __\par \par

## 2023-01-17 ENCOUNTER — APPOINTMENT (OUTPATIENT)
Dept: VASCULAR SURGERY | Facility: CLINIC | Age: 63
End: 2023-01-17
Payer: COMMERCIAL

## 2023-01-17 VITALS
BODY MASS INDEX: 41.95 KG/M2 | WEIGHT: 293 LBS | HEIGHT: 70 IN | DIASTOLIC BLOOD PRESSURE: 85 MMHG | HEART RATE: 84 BPM | TEMPERATURE: 98.1 F | SYSTOLIC BLOOD PRESSURE: 175 MMHG

## 2023-01-17 VITALS — SYSTOLIC BLOOD PRESSURE: 157 MMHG | HEART RATE: 103 BPM | DIASTOLIC BLOOD PRESSURE: 92 MMHG

## 2023-01-17 PROCEDURE — 93971 EXTREMITY STUDY: CPT | Mod: RT

## 2023-01-17 PROCEDURE — 99213 OFFICE O/P EST LOW 20 MIN: CPT | Mod: 25

## 2023-01-17 PROCEDURE — 29580 STRAPPING UNNA BOOT: CPT | Mod: 50

## 2023-01-17 NOTE — DATA REVIEWED
Is This A New Presentation, Or A Follow-Up?: Skin Lesion
[FreeTextEntry1] : 12/16/20 Venous Doppler: Right leg ASV reflux but ASV is short and tortuous\par \par 12/14/22 bilateral VLE\par Right: neg dvt/svt; + reflux CFV, AGSV, GSV calf, distal calf,  and VV\par Left: neg dvt/svt + reflux CFV, AGSV, GSV calf, distal calf,  and VV\par \par 12/14/22 PVR/KARLOS\par Right: 1.34\par Left: 1.32\par \par 1/17/23 right leg venous doppler\par no acute dvt/ svt\par right AGSV partially ablated proximally post varithena ablation extends into tributary

## 2023-01-17 NOTE — PHYSICAL EXAM
[2+] : right 2+ [1+] : left 1+ [Ankle Swelling (On Exam)] : present [Ankle Swelling Bilaterally] : bilaterally  [Ankle Swelling On The Right] : mild [Varicose Veins Of Lower Extremities] : bilaterally [] : bilaterally [Ankle Swelling On The Left] : moderate [Skin Ulcer] : ulcer [Alert] : alert [Oriented to Person] : oriented to person [Oriented to Place] : oriented to place [Oriented to Time] : oriented to time [Calm] : calm [de-identified] : well in NAD  [de-identified] : no resp distress [FreeTextEntry1] : LE vein findings:\par Varicosities (spider, reticular, varicose)  bilateral\par Edema bilateral\par Skin changes dry, flaky skin, stasis dermatitis and hyperpigmentation in the gator area\par Right medial malleolus ulcer 1 cm length x 0.5 cm width x 2 mm depth\par Right medial malleolus ulcer 0.5 cm length x 0.5 cm width x 2 mm depth\par Left medial malleolus ulcer 1 cm length x 1 cm width x 2 mm depth\par CEAP classification C6\par Palpable DP pulses bilaterally\par \par \par  [de-identified] : ARLEENL [de-identified] : bilateral medial malleolus ulcer  [de-identified] : bo cranial nerves 2-12 bo grossly intact [de-identified] : Cooperative

## 2023-01-17 NOTE — ASSESSMENT
[Arterial/Venous Disease] : arterial/venous disease [Ulcer Care] : ulcer care [FreeTextEntry1] : Impression - venous stasis ulcers, chronic venous insufficiency\par \par Plan\par Conservative medical management - leg elevation, exercise regimen, skin hygiene, weight loss, diet control, knee high compression stockings 30-40 mm hg after ulcers are healed from unna boot\par Advised pt to take Tylenol prn for pain instead of Advil to decrease bleeding since pt is already taking Xarelto for afib\par Advised pt when she showers to not scrub her skin around the ulcers too hard since her skin is very thin\par Pt scheduled for right distal gsv varithena next week, but patient may benefit from right GSV venaseal prior to right distal gsv varithena procedure since partial closure of right AGSV was noted on today's ultrasound\par Return to office next week for bilateral unna boot

## 2023-01-17 NOTE — HISTORY OF PRESENT ILLNESS
[FreeTextEntry1] : Mrs. Figueroa, 62 yr old female, with hx of chronic venous insufficiency and venous stasis ulcers s/p bilateral GSV RFA and R SSV RFA in 2018 and 2019 c/o of bilateral medial ankle wounds that started about 2 weeks ago. Pt denies injuries or broken bones. Pt applied bilateral unna boot herself when she noticed increased hyperpigmentation around her medial ankles. 2-3 days after initiating bilateral unna boot, the patient noticed the skin opened up in both medial ankles. Pt is also c/o of wound on left 3rd toe that started about a month ago. She denies any injuries to the site. She has been applying hydrogen peroxide and antibiotic ointment to the wound.\par \par Pt denies claudication or rest pain. Pt works as a  and stands on her feet for about 10 hrs a day. She wears knee high 20-30 compression stockings daily for the past 3 months, instead of 30-40 mm hg as previously recommended. She elevates her legs at home at the end of the day.\par \par PMH: afib, chronic venous insufficiency, left ankle fracture, htn, hld, TIA 10 yrs ago\par Meds: Xarelto 20 mg daily, Lisinopril, Atorvastatin, Metoprolol [de-identified] : pt is here for 1 week venous doppler of the right leg s/p right anterior GSV ultrasound guided microfoam chemical ablation with Varithena on 1/9/23\par also here for bilateral unna boot\par pt doing well overall\par pt states her right leg wounds are healing better after Varithena procedure\par bilateral lower extremity discomfort has improved\par pt complains of stinging sensation and mild pain around bilateral leg ulcers still\par pain is worse when standing and sitting for too long\par pt takes tylenol for the pain. Tylenol has been helping\par pt reapplied bilateral unna boot on her own after showering\par skin on left medial malleolus wound still appears to be red, darken, and raw looking from scrubbing it in the shower \par ulcers on bilateral medial malleolus healing well\par pt reports minimal light brown drainage coming from left medial malleolus wound\par left 3rd toe wound/ulcer is dry and scabbed over. pt has been putting betadine daily over left 3rd toe wound\par no new wounds/ulcers\par \par \par \par

## 2023-01-25 ENCOUNTER — APPOINTMENT (OUTPATIENT)
Dept: VASCULAR SURGERY | Facility: CLINIC | Age: 63
End: 2023-01-25
Payer: COMMERCIAL

## 2023-01-25 VITALS
HEART RATE: 71 BPM | BODY MASS INDEX: 41.95 KG/M2 | SYSTOLIC BLOOD PRESSURE: 141 MMHG | WEIGHT: 293 LBS | DIASTOLIC BLOOD PRESSURE: 85 MMHG | HEIGHT: 70 IN

## 2023-01-25 VITALS — HEART RATE: 73 BPM | TEMPERATURE: 97.9 F | SYSTOLIC BLOOD PRESSURE: 126 MMHG | DIASTOLIC BLOOD PRESSURE: 77 MMHG

## 2023-01-25 PROCEDURE — 29580 STRAPPING UNNA BOOT: CPT | Mod: 50

## 2023-01-25 PROCEDURE — 99213 OFFICE O/P EST LOW 20 MIN: CPT | Mod: 25

## 2023-01-25 RX ORDER — ALPRAZOLAM 0.5 MG/1
0.5 TABLET ORAL
Qty: 1 | Refills: 0 | Status: COMPLETED | COMMUNITY
Start: 2023-01-25 | End: 2023-01-30

## 2023-01-25 NOTE — HISTORY OF PRESENT ILLNESS
[FreeTextEntry1] : Mrs. Figueroa, 62 yr old female, with hx of chronic venous insufficiency and venous stasis ulcers s/p bilateral GSV RFA and R SSV RFA in 2018 and 2019 c/o of bilateral medial ankle wounds that started about 2 weeks ago. Pt denies injuries or broken bones. Pt applied bilateral unna boot herself when she noticed increased hyperpigmentation around her medial ankles. 2-3 days after initiating bilateral unna boot, the patient noticed the skin opened up in both medial ankles. Pt is also c/o of wound on left 3rd toe that started about a month ago. She denies any injuries to the site. She has been applying hydrogen peroxide and antibiotic ointment to the wound.\par \par Pt denies claudication or rest pain. Pt works as a  and stands on her feet for about 10 hrs a day. She wears knee high 20-30 compression stockings daily for the past 3 months, instead of 30-40 mm hg as previously recommended. She elevates her legs at home at the end of the day.\par \par PMH: afib, chronic venous insufficiency, left ankle fracture, htn, hld, TIA 10 yrs ago\par Meds: Xarelto 20 mg daily, Lisinopril, Atorvastatin, Metoprolol [de-identified] : pt here for bilateral unna boot\par s/p right anterior GSV ultrasound guided microfoam chemical ablation with Varithena on 1/9/23\par pt doing well overall\par bilateral lower extremity discomfort has improved a lot\par ulcers on bilateral medial malleolus healing well\par one of the ulcers on right medial malleolus completely healed\par pt reports minimal light brown drainage coming from left medial malleolus wound\par no new wounds/ulcers\par \par

## 2023-01-25 NOTE — ASSESSMENT
[Arterial/Venous Disease] : arterial/venous disease [Ulcer Care] : ulcer care [FreeTextEntry1] : Impression - venous stasis ulcers, chronic venous insufficiency\par \par Plan\par Conservative medical management - leg elevation, exercise regimen, skin hygiene, weight loss, diet control, knee high compression stockings 30-40 mm hg after ulcers are healed from unna boot\par Return to office next Monday for scheduled vein procedure.

## 2023-01-25 NOTE — PHYSICAL EXAM
[2+] : right 2+ [1+] : left 1+ [Ankle Swelling (On Exam)] : present [Ankle Swelling Bilaterally] : bilaterally  [Ankle Swelling On The Right] : mild [Varicose Veins Of Lower Extremities] : bilaterally [] : bilaterally [Ankle Swelling On The Left] : moderate [Skin Ulcer] : ulcer [Alert] : alert [Oriented to Person] : oriented to person [Oriented to Place] : oriented to place [Oriented to Time] : oriented to time [Calm] : calm [de-identified] : well in NAD  [de-identified] : no resp distress [FreeTextEntry1] : LE vein findings:\par Varicosities (spider, reticular, varicose)  bilateral\par Edema bilateral\par Skin changes dry, flaky skin, stasis dermatitis and hyperpigmentation in the gator area\par Bilateral malleoli wounds healing well\par Right medial malleolus ulcer 1 cm length x 0.5 cm width x 1 mm depth\par Left medial malleolus ulcer 1 cm length x 1 cm width x 2 mm depth\par CEAP classification C6\par Palpable DP pulses bilaterally\par  [de-identified] : ARLEENL [de-identified] : bilateral medial malleolus ulcer  [de-identified] : bo cranial nerves 2-12 bo grossly intact [de-identified] : Cooperative

## 2023-01-25 NOTE — DATA REVIEWED
[FreeTextEntry1] : 12/16/20 Venous Doppler: Right leg ASV reflux but ASV is short and tortuous\par \par 12/14/22 bilateral VLE\par Right: neg dvt/svt; + reflux CFV, AGSV, GSV calf, distal calf,  and VV\par Left: neg dvt/svt + reflux CFV, AGSV, GSV calf, distal calf,  and VV\par \par 12/14/22 PVR/KARLOS\par Right: 1.34\par Left: 1.32\par \par 1/17/23 right leg venous doppler\par no acute dvt/ svt\par right AGSV partially ablated proximally post varithena ablation extends into tributary

## 2023-01-30 ENCOUNTER — APPOINTMENT (OUTPATIENT)
Dept: VASCULAR SURGERY | Facility: CLINIC | Age: 63
End: 2023-01-30
Payer: COMMERCIAL

## 2023-01-30 PROCEDURE — 36482Z ENDOVEN THER CHEM ADHES 1ST: CUSTOM | Mod: RT

## 2023-01-30 NOTE — PROCEDURE
[FreeTextEntry1] : right distal GSV Venaseal [FreeTextEntry3] : Procedural safety checklist and time out completed:\par Confirmed patient identification (Patient Name, , and/or medical record number including when possible affirmation by patient or parent/family/other).\par Confirmed procedure with the patient. Consent present, accurate and signed. \par Confirmed special equipment and supplies are present.\par Sterility confirmed. Position verified. \par Site/ side is marked and visible and confirmed. \par Procedure confirmed by consent. Accurate consent including side and site.\par Review of medical records, including venous ultrasound, noting correct procedure including site and side.\par MD/PA verifies presence and review of imaging studies and or written report of imaging studies.\par Agreement on the procedure to be performed\par Time out completed.\par All of the above has been confirmed by the team.\par All patient-specific concerns have been addressed. \par \par Indication: right lower extremity varicose veins with ulcer, inflammation, leg pain, leg swelling, and leg cramping.  Venous insufficiency/ reflux.\par \par Procedure:  Endovenous ablation of the right distal great saphenous vein with VenaSeal™ Closure System\par 	\par [Ms. CHUCK CORDERO is a 62 year old F with a history of right lower extremity varicose veins and venous insufficiency previously seen in the office.  Ultrasound examination demonstrated venous insufficiency. A trial of compression stockings, exercise, elevation, and pain medication was attempted without relief and definitive treatment with radiofrequency ablation was offered. \par \par I have discussed the risks of the procedure at length with the patient. The risks discussed were inclusive of but not limited to infection, irritation at the site of infiltration of local anesthesia, and also rare risk of deep venous thrombosis and pulmonary emboli. The patient agrees to proceed with the procedure. \par \par The patient was escorted into the procedure room and a time out called.\par \par The entire limb was prepped and draped in sterile fashion. Ultrasound guidance was used to localize the access site. 1% lidocaine was injected as a local anesthetic in the subcutaneous tissues at the target location in the leg. Using ultrasound guidance, access was gained at this location with the 19 gauge thin walled access needle and followed by introduction of a short guidewire, location confirmed with ultrasound. A small, 3 mm incision was made at the access site to allow for introduction and placement of the 7 Fr x7cm introducer/dilator. The dilator and guidewire were removed. The 0.035 guidewire from the Venaseal kit was then introduced and positioned__ using ultrasound guidance. The 80 cm 7 Fr introducer sheath/dilator was positioned. The guidewire and dilator were removed, and the remaining sheath was flushed with sterile saline, with the syringe remaining in place prior to the next steps. \par \par The cyanoacrylate adhesive was precisely primed into the 5 F delivery catheter and this catheter/syringe combination was attached within the dispenser gun. This “assembly” was introduced through the 7 F sheath and positioned under ultrasound guidance. The steps from the IFU were followed for dispensing amounts, locations and compression times, e.g 2 aliquots proximally with 3 minutes of compression, and 1 aliquot every 3cm distally with 30 sec of compression along the course of the vessel Following the last injection and compression sequence, the catheter and introducer sheath were pulled out from the access site. Hemostasis was achieved with manual compression and an adhesive bandage was applied to the incision. Ultrasound confirmed complete coaptation and closure of the treated segments of the GSV, and the absence of any DVT at the saphenofemoral junction. \par \par Treatment length of the vein _29_cm.\par \par The drapes were removed and the patient cleaned and prepared for discharge. Patient tolerated procedure well. Patient was given post-procedure instructions and follow up appointment was scheduled.  Post op ultrasound  is scheduled.\par \par \par

## 2023-02-01 RX ORDER — ALPRAZOLAM 0.5 MG/1
0.5 TABLET ORAL
Qty: 1 | Refills: 0 | Status: COMPLETED | COMMUNITY
Start: 2023-02-01 | End: 2023-02-06

## 2023-02-06 ENCOUNTER — APPOINTMENT (OUTPATIENT)
Dept: VASCULAR SURGERY | Facility: CLINIC | Age: 63
End: 2023-02-06
Payer: COMMERCIAL

## 2023-02-06 PROCEDURE — 36465Z: CUSTOM | Mod: RT

## 2023-02-06 PROCEDURE — 29580 STRAPPING UNNA BOOT: CPT | Mod: RT,59

## 2023-02-06 PROCEDURE — 93971 EXTREMITY STUDY: CPT | Mod: RT,59

## 2023-02-06 NOTE — PROCEDURE
[FreeTextEntry1] : Ultrasound guided microfoam chemical ablation with Varithena® of the right distal great saphenous vein [FreeTextEntry3] : Procedural safety checklist and time out completed:\par Confirmed patient identification (Patient Name, , and/or medical record number including when possible affirmation by patient or parent/family/other).\par Confirmed procedure with the patient. Consent present, accurate and signed. \par Confirmed special equipment and supplies are present.\par Sterility confirmed. Position verified. \par Site/ side is marked and visible and confirmed. \par Procedure confirmed by consent. Accurate consent including side and site.\par Review of medical records, including venous ultrasound, noting correct procedure including site and side.\par MD/PA verifies presence and review of imaging studies and or written report of imaging studies.\par Agreement on the procedure to be performed\par Time out completed.\par All of the above has been confirmed by the team.\par All patient-specific concerns have been addressed. \par \par Indication: right lower extremity varicose veins with ulcer, inflammation, leg pain, leg swelling, and leg cramping.  Venous insufficiency/ reflux.\par \par Procedure: Ultrasound guided microfoam chemical ablation with Varithena® of the right distal great saphenous vein\par 	\par [Ms. CHUCK CORDERO is a 62 year old F with a history of right lower extremity varicose veins and venous insufficiency previously seen in the office.  Ultrasound examination demonstrated venous insufficiency. A trial of compression stockings, exercise, elevation, and pain medication was attempted without relief and definitive treatment with radiofrequency ablation was offered. \par \par The patient has come for ultrasound guided microfoam chemical ablation with Varithena® of the right distal great saphenous vein\par I have discussed the risks of the procedure at length with the patient. The risks discussed were inclusive of but not limited to infection, irritation at the site of infiltration of local anesthesia, and also rare risk of deep venous thrombosis and pulmonary emboli. The patient agrees to proceed with the procedure. \par The patient was escorted into the procedure room and a time out called.\par \par The patient was placed on the procedure room table and was evaluated in reverse Trendelenburg position. The leg was prepped and sterile drapes applied. 1.0% lidocaine was administered at the chosen access site for local anesthesia. The vein was accessed using a micro-puncture needle followed by a guide wire through the punctured needle and dilator sheath, standard IV catheters, or butterfly needle under advanced ultrasound guidance. Vein access was established and confirmed by aspiration of dark low pressure blood. After gaining access at distal GSV, the leg was positioned at 45 degrees of elevation in relationship to the torso. \par The Varithena canister was primed and purged as required by the instructions. A 10mL aliquot was drawn into a sterile syringe then attached to the access device. \par \par Varithena was administered at 1.0 cc per second with close observation under ultrasound in its course of the GSV. The vein was observed for spasm under ultrasound, once vein was in full spasm the administration of Varithena was stopped \par \par After the administration of Varithena the patient was asked to dorsiflex the ankle to limit flow of foam into perforating veins. Once appropriate spasm had been confirmed in the treated veins, the access device was removed from the leg and light pressure was applied to the puncture site for hemostasis.\par \par The common femoral and deep superficial veins were then evaluated for flow and compressibility prior to dressing placement. The lower extremity was kept elevated at 45 degree angle and an unnaboot dressing was applied including an under layer, compression pad, and thigh high 20-30 mmHg compression stocking to the patient. The leg was lowered only after compression had been applied. \par \par The patient ambulated 10 minutes under supervision and was without concerns at time of release. The patient was instructed to take an anti-inflammatory medicine as needed and to follow up for duplex scan of treated vein. \par \par Patient tolerated procedure, was given post-procedure instructions and a follow-up appt scheduled.\par Post-care instructions include walking, wearing compression during the day, taking off only to shower and then reapplying for two weeks, advising patient to keep post-treatment bandages in place and dry for 48 hours, avoid extended periods of inactivity, avoid heavy exercise for one week, to walk daily for 10 minutes over the next month. The patient was instructed to take an anti-inflammatory medicine as needed.\par LOT#  _[id number]_\par EXP _10/23_\par \par

## 2023-02-07 RX ORDER — ALPRAZOLAM 0.5 MG/1
0.5 TABLET ORAL
Qty: 1 | Refills: 0 | Status: COMPLETED | COMMUNITY
Start: 2023-02-07 | End: 2023-02-13

## 2023-02-13 ENCOUNTER — APPOINTMENT (OUTPATIENT)
Dept: VASCULAR SURGERY | Facility: CLINIC | Age: 63
End: 2023-02-13
Payer: COMMERCIAL

## 2023-02-13 PROCEDURE — 29580 STRAPPING UNNA BOOT: CPT | Mod: 50,59

## 2023-02-13 PROCEDURE — 93971 EXTREMITY STUDY: CPT | Mod: RT

## 2023-02-13 PROCEDURE — 36465Z: CUSTOM | Mod: LT

## 2023-02-13 RX ORDER — CHOLECALCIFEROL (VITAMIN D3) 25 MCG
25 MCG TABLET ORAL
Refills: 0 | Status: ACTIVE | COMMUNITY
Start: 2023-02-13

## 2023-02-13 RX ORDER — FERROUS SULFATE 325(65) MG
325 (65 FE) TABLET ORAL
Refills: 0 | Status: ACTIVE | COMMUNITY
Start: 2023-02-13

## 2023-02-13 NOTE — PROCEDURE
[FreeTextEntry1] : Ultrasound guided microfoam chemical ablation with Varithena® of the left anterior great saphenous vein and bilateral unna boot application [FreeTextEntry3] : Patient is s/p right distal GSV Varithena. VLE shows closure of the left distal GSV without DVT. Patient presents for left AGSV Varithena.\par \par Procedural safety checklist and time out completed:\par Confirmed patient identification (Patient Name, , and/or medical record number including when possible affirmation by patient or parent/family/other).\par Confirmed procedure with the patient. Consent present, accurate and signed. \par Confirmed special equipment and supplies are present.\par Sterility confirmed. Position verified. \par Site/ side is marked and visible and confirmed. \par Procedure confirmed by consent. Accurate consent including side and site.\par Review of medical records, including venous ultrasound, noting correct procedure including site and side.\par MD/PA verifies presence and review of imaging studies and or written report of imaging studies.\par Agreement on the procedure to be performed\par Time out completed.\par All of the above has been confirmed by the team.\par All patient-specific concerns have been addressed. \par \par Indication: left lower extremity varicose veins with bilateral  venous stasis ulcers, inflammation, leg pain, leg swelling, and leg cramping.  Venous insufficiency/ reflux.\par \par Procedure: Ultrasound guided microfoam chemical ablation with Varithena® of the left anterior great saphenous vein\par 	\par [Ms. CHUCK CRODERO is a 62 year old F with a history of left lower extremity varicose veins and venous insufficiency previously seen in the office.  Ultrasound examination demonstrated venous insufficiency. A trial of compression stockings, exercise, elevation, and pain medication was attempted without relief and definitive treatment with radiofrequency ablation was offered. \par \par The patient has come for ultrasound guided microfoam chemical ablation with Varithena® of the left anterior great saphenous vein\par I have discussed the risks of the procedure at length with the patient. The risks discussed were inclusive of but not limited to infection, irritation at the site of infiltration of local anesthesia, and also rare risk of deep venous thrombosis and pulmonary emboli. The patient agrees to proceed with the procedure. \par The patient was escorted into the procedure room and a time out called.\par \par The patient was placed on the procedure room table and was evaluated in reverse Trendelenburg position. The leg was prepped and sterile drapes applied. 1.0% lidocaine was administered at the chosen access site for local anesthesia. The vein was accessed using a 4F micro-puncture needle followed by a guide wire through the punctured needle and dilator sheath under advanced ultrasound guidance. Vein access was established and confirmed by aspiration of dark low pressure blood. After gaining access at anterior prox-mid thigh, the leg was positioned at 45 degrees of elevation in relationship to the torso. \par The Varithena canister was primed and purged as required by the instructions. A 10 mL aliquot was drawn into a sterile syringe then attached to the access device. \par \par Varithena was slowly administered at 1.0 cc/second with close observation by ultrasound of its course in the GSV. When delivery arrived at approximately 3-5 cm from the SFJ, the GSV was compressed to prevent flow into the common femoral vein. Further administration of Varithena was stopped at this point and the treated GSV was observed for spasm with ultrasound over a period of 3-5 minutes. \par \par After the administration of Varithena the patient was asked to dorsiflex the ankle to limit flow of foam into perforating veins. Once appropriate spasm had been confirmed in the treated veins, the access device was removed from the leg and light pressure was applied to the puncture site for hemostasis.\par \par The common femoral and deep superficial veins were then evaluated for flow and compressibility prior to dressing placement. The lower extremity was kept elevated at 45 degree angle and a multilayer dressing was applied including an under layer, compression pad, and thigh high 20-30 mmHg compression stocking to the patient. The leg was lowered only after compression had been applied. \par \par The patient ambulated 10 minutes under supervision and was without concerns at time of release. The patient was instructed to take an anti-inflammatory medicine as needed and to follow up for duplex scan of treated vein. \par \par Patient tolerated procedure, was given post-procedure instructions and a follow-up appt scheduled.\par Post-care instructions include walking, wearing compression during the day, taking off only to shower and then reapplying for two weeks, advising patient to keep post-treatment bandages in place and dry for 48 hours, avoid extended periods of inactivity, avoid heavy exercise for one week, to walk daily for 10 minutes over the next month. The patient was instructed to take an anti-inflammatory medicine as needed.\par LOT#  [id number]\par EXP 10/2023\par \par \par Indication:  bilateral lower extremity venous stasis ulcers.\par \par Procedure: bilateral lower extremity unna boot application. \par \par Procedure Note:  Ms. CHUCK CORDERO is a 62 year old F with CEAP C6,  lower extremity venous stasis ulcers and bilateral unna boot applied\par \par I have discussed the risks of the procedure with the patient. Detailed discussion was held with the patient. Patient tolerated the procedure well. Instructions reviewed with the patient and patient verbalized understanding.\par \par \par \par

## 2023-02-21 ENCOUNTER — APPOINTMENT (OUTPATIENT)
Dept: VASCULAR SURGERY | Facility: CLINIC | Age: 63
End: 2023-02-21
Payer: COMMERCIAL

## 2023-02-21 PROCEDURE — 99212 OFFICE O/P EST SF 10 MIN: CPT | Mod: 25

## 2023-02-21 PROCEDURE — 93971 EXTREMITY STUDY: CPT | Mod: LT

## 2023-02-21 PROCEDURE — 29580 STRAPPING UNNA BOOT: CPT | Mod: LT

## 2023-02-22 NOTE — HISTORY OF PRESENT ILLNESS
[FreeTextEntry1] : Mrs. Figueroa, 62 yr old female, with hx of chronic venous insufficiency and venous stasis ulcers s/p bilateral GSV RFA and R SSV RFA in 2018 and 2019 c/o of bilateral medial ankle wounds that started about 2 weeks ago. Pt denies injuries or broken bones. Pt applied bilateral unna boot herself when she noticed increased hyperpigmentation around her medial ankles. 2-3 days after initiating bilateral unna boot, the patient noticed the skin opened up in both medial ankles. Pt is also c/o of wound on left 3rd toe that started about a month ago. She denies any injuries to the site. She has been applying hydrogen peroxide and antibiotic ointment to the wound.\par \par Pt denies claudication or rest pain. Pt works as a  and stands on her feet for about 10 hrs a day. She wears knee high 20-30 compression stockings daily for the past 3 months, instead of 30-40 mm hg as previously recommended. She elevates her legs at home at the end of the day.\par \par PMH: afib, chronic venous insufficiency, left ankle fracture, htn, hld, TIA 10 yrs ago\par Meds: Xarelto 20 mg daily, Lisinopril, Atorvastatin, Metoprolol [de-identified] : pt here for left unna boot after ultrasound\par s/p right AGSV ultrasound guided microfoam chemical ablation with Varithena on 1/9/23, right distal GSV venaseal 1/30/23, right distal gsv tributary veins using varithena 2/6/23 and left agsv varithena on 2/13/23\par

## 2023-02-22 NOTE — PROCEDURE
[FreeTextEntry1] : Left Unna boot applied from toes to knee  w/o complications\par Pt misa procedure well\par Pt is not allergic to the unna boot\par

## 2023-02-22 NOTE — PHYSICAL EXAM
[2+] : right 2+ [1+] : left 1+ [Ankle Swelling (On Exam)] : present [Ankle Swelling Bilaterally] : bilaterally  [Ankle Swelling On The Right] : mild [Varicose Veins Of Lower Extremities] : bilaterally [] : bilaterally [Ankle Swelling On The Left] : moderate [Skin Ulcer] : ulcer [Alert] : alert [Oriented to Person] : oriented to person [Oriented to Place] : oriented to place [Oriented to Time] : oriented to time [Calm] : calm [de-identified] : well in NAD  [de-identified] : no resp distress [FreeTextEntry1] : LE vein findings:\par Varicosities (spider, reticular, varicose)  bilateral\par Edema bilateral\par Skin changes dry, flaky skin, stasis dermatitis and hyperpigmentation in the gator area\par Bilateral malleoli wounds healing well\par Right medial malleolus ulcer 1 cm length x 0.5 cm width x 1 mm depth\par Left medial malleolus ulcer 1 cm length x 1 cm width x 2 mm depth\par CEAP classification C6\par Palpable DP pulses bilaterally\par  [de-identified] : ARLEENL [de-identified] : bilateral medial malleolus ulcer  [de-identified] : bo cranial nerves 2-12 bo grossly intact [de-identified] : Cooperative

## 2023-02-23 RX ORDER — ALPRAZOLAM 0.5 MG/1
0.5 TABLET ORAL
Qty: 1 | Refills: 0 | Status: COMPLETED | COMMUNITY
Start: 2023-02-23 | End: 2023-02-27

## 2023-02-27 ENCOUNTER — APPOINTMENT (OUTPATIENT)
Dept: VASCULAR SURGERY | Facility: CLINIC | Age: 63
End: 2023-02-27
Payer: COMMERCIAL

## 2023-02-27 PROCEDURE — 29580 STRAPPING UNNA BOOT: CPT | Mod: 50,59

## 2023-02-27 PROCEDURE — 36466Z: CUSTOM | Mod: LT

## 2023-02-27 NOTE — PROCEDURE
[FreeTextEntry1] : ultrasound guided microfoam chemical ablation with Varithena® of the left distal great saphenous vein [FreeTextEntry3] : Procedural safety checklist and time out completed:\par Confirmed patient identification (Patient Name, , and/or medical record number including when possible affirmation by patient or parent/family/other).\par Confirmed procedure with the patient. Consent present, accurate and signed. \par Confirmed special equipment and supplies are present.\par Sterility confirmed. Position verified. \par Site/ side is marked and visible and confirmed. \par Procedure confirmed by consent. Accurate consent including side and site.\par Review of medical records, including venous ultrasound, noting correct procedure including site and side.\par MD/PA verifies presence and review of imaging studies and or written report of imaging studies.\par Agreement on the procedure to be performed\par Time out completed.\par All of the above has been confirmed by the team.\par All patient-specific concerns have been addressed. \par \par Indication: left lower extremity varicose veins with ulcer, inflammation, leg pain, leg swelling, and leg cramping.  Venous insufficiency/ reflux.\par \par Procedure: ultrasound guided microfoam chemical ablation with Varithena® of the left distal great saphenous vein\par 	\par [Ms. CHUCK CORDERO is a 62 year old F with a history of left lower extremity varicose veins and venous insufficiency previously seen in the office.  Ultrasound examination demonstrated venous insufficiency. A trial of compression stockings, exercise, elevation, and pain medication was attempted without relief and definitive treatment with radiofrequency ablation was offered. \par \par The patient has come for ultrasound guided microfoam chemical ablation with Varithena® of the left distal great saphenous vein\par I have discussed the risks of the procedure at length with the patient. The risks discussed were inclusive of but not limited to infection, irritation at the site of infiltration of local anesthesia, and also rare risk of deep venous thrombosis and pulmonary emboli. The patient agrees to proceed with the procedure. \par The patient was escorted into the procedure room and a time out called.\par \par The patient was placed on the procedure room table and was evaluated in reverse Trendelenburg position. The leg was prepped and sterile drapes applied. 1.0% lidocaine was administered at the chosen access site for local anesthesia. The vein was accessed using a ____micro-puncture needle followed by a guide wire through the punctured needle and dilator sheath, standard IV catheters, or butterfly needle under advanced ultrasound guidance. Vein access was established and confirmed by aspiration of dark low pressure blood. After gaining access at __L ankle_, the leg was positioned at 45 degrees of elevation in relationship to the torso. \par The Varithena canister was primed and purged as required by the instructions. A _5_mL aliquot was drawn into a sterile syringe then attached to the access device. \par \par Varithena was administered at 1.0 cc per second with close observation under ultrasound in its course of the GSV. The vein was observed for spasm under ultrasound, once vein was in full spasm the administration of Varithena was stopped \par \par After the administration of Varithena the patient was asked to dorsiflex the ankle to limit flow of foam into perforating veins. Once appropriate spasm had been confirmed in the treated veins, the access device was removed from the leg and light pressure was applied to the puncture site for hemostasis.\par \par The common femoral and deep superficial veins were then evaluated for flow and compressibility prior to dressing placement. The lower extremity was kept elevated at 45 degree angle and a multilayer dressing was applied including an under layer, compression pad, and thigh high 20-30 mmHg compression stocking to the patient. The leg was lowered only after compression had been applied. Bilateral unna boots were applied to manage bilateral venous stasis ulcers.  \par \par The patient ambulated 10 minutes under supervision and was without concerns at time of release. The patient was instructed to take an anti-inflammatory medicine as needed and to follow up for duplex scan of treated vein. \par \par Patient tolerated procedure, was given post-procedure instructions and a follow-up appt scheduled.\par Post-care instructions include walking, wearing compression during the day, taking off only to shower and then reapplying for two weeks, advising patient to keep post-treatment bandages in place and dry for 48 hours, avoid extended periods of inactivity, avoid heavy exercise for one week, to walk daily for 10 minutes over the next month. The patient was instructed to take an anti-inflammatory medicine as needed.\par LOT#  __[id number]\par EXP __10/23\par \par

## 2023-03-06 ENCOUNTER — APPOINTMENT (OUTPATIENT)
Dept: VASCULAR SURGERY | Facility: CLINIC | Age: 63
End: 2023-03-06
Payer: COMMERCIAL

## 2023-03-06 VITALS
HEART RATE: 81 BPM | BODY MASS INDEX: 41.95 KG/M2 | SYSTOLIC BLOOD PRESSURE: 131 MMHG | DIASTOLIC BLOOD PRESSURE: 83 MMHG | HEIGHT: 70 IN | TEMPERATURE: 98 F | WEIGHT: 293 LBS

## 2023-03-06 PROCEDURE — 99213 OFFICE O/P EST LOW 20 MIN: CPT | Mod: 25

## 2023-03-06 PROCEDURE — 29580 STRAPPING UNNA BOOT: CPT | Mod: 50

## 2023-03-06 PROCEDURE — 93971 EXTREMITY STUDY: CPT | Mod: LT

## 2023-03-21 ENCOUNTER — APPOINTMENT (OUTPATIENT)
Dept: VASCULAR SURGERY | Facility: CLINIC | Age: 63
End: 2023-03-21
Payer: COMMERCIAL

## 2023-03-21 VITALS
WEIGHT: 293 LBS | TEMPERATURE: 97.7 F | HEART RATE: 94 BPM | SYSTOLIC BLOOD PRESSURE: 136 MMHG | DIASTOLIC BLOOD PRESSURE: 84 MMHG | HEIGHT: 70 IN | BODY MASS INDEX: 41.95 KG/M2

## 2023-03-21 VITALS — DIASTOLIC BLOOD PRESSURE: 82 MMHG | HEART RATE: 73 BPM | SYSTOLIC BLOOD PRESSURE: 142 MMHG

## 2023-03-21 PROCEDURE — 99213 OFFICE O/P EST LOW 20 MIN: CPT | Mod: 25

## 2023-03-21 PROCEDURE — 29580 STRAPPING UNNA BOOT: CPT | Mod: 50

## 2023-03-21 NOTE — ASSESSMENT
[Arterial/Venous Disease] : arterial/venous disease [Ulcer Care] : ulcer care [FreeTextEntry1] : Ms. CHUCK CORDERO is a 62 year F with lymphedema extending proximally into truncal region, secondary to chronic venous insufficiency. Despite her efforts of compression (20-30mmHg), elevation and exercise for over 4 weeks her swelling has progressed and symptoms persist. Early signs of hyperpigmentation are noted. Also, she has recurrent bilateral venous stasis ulcers\par \par \par Plan\par Conservative medical management - leg elevation, exercise regimen, skin hygiene, weight loss, diet control, knee high compression stockings 30-40 mm hg after ulcers are healed from unna boot, and pneumatic compression device\par Pt will benefit from pneumatic compression device since patient has persistent leg swelling and venous stasis ulcers. Referral for [facility] sent.\par Bilateral lower extremity venous doppler at the end of the month\par Return to office next Wednesday to evaluate bilateral lower extremities for unna boot\par

## 2023-03-21 NOTE — DATA REVIEWED
[FreeTextEntry1] : 12/16/20 Venous Doppler: Right leg ASV reflux but ASV is short and tortuous\par \par 12/14/22 bilateral VLE\par Right: neg dvt/svt; + reflux CFV, AGSV, GSV calf, distal calf,  and VV\par Left: neg dvt/svt + reflux CFV, AGSV, GSV calf, distal calf,  and VV\par \par 12/14/22 PVR/KARLOS\par Right: 1.34\par Left: 1.32\par \par 1/17/23 right leg venous doppler\par no acute dvt/ svt\par right AGSV partially ablated proximally post varithena ablation extends into tributary \par \par 3/6/2023 left leg venous doppler\par no acute dvt/svt\par patent gsv/tribs without closure s/p left distal gsv varithena

## 2023-03-21 NOTE — PHYSICAL EXAM
[2+] : right 2+ [1+] : left 1+ [Ankle Swelling (On Exam)] : present [Ankle Swelling Bilaterally] : bilaterally  [Ankle Swelling On The Right] : mild [Varicose Veins Of Lower Extremities] : bilaterally [] : bilaterally [Ankle Swelling On The Left] : moderate [Skin Ulcer] : ulcer [Alert] : alert [Oriented to Person] : oriented to person [Oriented to Place] : oriented to place [Oriented to Time] : oriented to time [Calm] : calm [de-identified] : well in NAD  [de-identified] : no resp distress [de-identified] : ARLEENL [FreeTextEntry1] : LE vein findings:\par Varicosities (spider, reticular, varicose)  bilateral\par Edema bilateral\par Skin changes dry, flaky skin, stasis dermatitis and hyperpigmentation in the gator area\par Bilateral malleoli wounds healing well\par CEAP classification C6\par Palpable DP pulses bilaterally\par  [de-identified] : ARLEENL [de-identified] : bilateral medial malleolus ulcer  [de-identified] : bo cranial nerves 2-12 bo grossly intact [de-identified] : Cooperative

## 2023-03-21 NOTE — PHYSICAL EXAM
[2+] : right 2+ [1+] : left 1+ [Ankle Swelling (On Exam)] : present [Ankle Swelling Bilaterally] : bilaterally  [Ankle Swelling On The Right] : mild [Varicose Veins Of Lower Extremities] : bilaterally [] : bilaterally [Ankle Swelling On The Left] : moderate [Skin Ulcer] : ulcer [Alert] : alert [Oriented to Person] : oriented to person [Oriented to Place] : oriented to place [Oriented to Time] : oriented to time [Calm] : calm [de-identified] : well in NAD  [de-identified] : no resp distress [FreeTextEntry1] : LE vein findings:\par Varicosities (spider, reticular, varicose)  bilateral\par Edema bilateral\par Skin changes dry, flaky skin, stasis dermatitis and hyperpigmentation in the gator area\par Bilateral malleoli wounds healing well\par Left medial malleolus ulcer 1 cm length x 1 cm width x 2 mm depth\par CEAP classification C6\par Palpable DP pulses bilaterally\par  [de-identified] : ARLEENL [de-identified] : bilateral medial malleolus ulcer  [de-identified] : bo cranial nerves 2-12 bo grossly intact [de-identified] : Cooperative

## 2023-03-21 NOTE — HISTORY OF PRESENT ILLNESS
[FreeTextEntry1] : Mrs. Figueroa, 62 yr old female, with hx of chronic venous insufficiency and venous stasis ulcers s/p bilateral GSV RFA and R SSV RFA in 2018 and 2019 c/o of bilateral medial ankle wounds that started about 2 weeks ago. Pt denies injuries or broken bones. Pt applied bilateral unna boot herself when she noticed increased hyperpigmentation around her medial ankles. 2-3 days after initiating bilateral unna boot, the patient noticed the skin opened up in both medial ankles. Pt is also c/o of wound on left 3rd toe that started about a month ago. She denies any injuries to the site. She has been applying hydrogen peroxide and antibiotic ointment to the wound.\par \par Pt denies claudication or rest pain. Pt works as a  and stands on her feet for about 10 hrs a day. She wears knee high 20-30 compression stockings daily for the past 3 months, instead of 30-40 mm hg as previously recommended. She elevates her legs at home at the end of the day.\par \par PMH: afib, chronic venous insufficiency, left ankle fracture, htn, hld, TIA 10 yrs ago\par Meds: Xarelto 20 mg daily, Lisinopril, Atorvastatin, Metoprolol [de-identified] : pt here for bilateral unna boot after ultrasound\par s/p right AGSV varithena on 1/9/23, right distal GSV venaseal 1/30/23, right distal gsv tributary veins using varithena 2/6/23, left agsv varithena on 2/13/23 and left distal GSV varithena on 2/27/23\par pt doing well overall\par pt denies discomfort by the wounds\par ulcers on left medial malleolus healing well\par ulcers on right medial malleolus healed\par pt reports minimal light red drainage coming from left medial malleolus wound\par bilateral leg swelling persists\par pt elevates her legs every day\par no new wounds/ulcers

## 2023-03-21 NOTE — HISTORY OF PRESENT ILLNESS
[FreeTextEntry1] : Mrs. Figueroa, 62 yr old female, with hx of chronic venous insufficiency and venous stasis ulcers s/p bilateral GSV RFA and R SSV RFA in 2018 and 2019 c/o of bilateral medial ankle wounds that started about 2 weeks ago. Pt denies injuries or broken bones. Pt applied bilateral unna boot herself when she noticed increased hyperpigmentation around her medial ankles. 2-3 days after initiating bilateral unna boot, the patient noticed the skin opened up in both medial ankles. Pt is also c/o of wound on left 3rd toe that started about a month ago. She denies any injuries to the site. She has been applying hydrogen peroxide and antibiotic ointment to the wound.\par \par Pt denies claudication or rest pain. Pt works as a  and stands on her feet for about 10 hrs a day. She wears knee high 20-30 compression stockings daily for the past 3 months, instead of 30-40 mm hg as previously recommended. She elevates her legs at home at the end of the day.\par \par PMH: afib, chronic venous insufficiency, left ankle fracture, htn, hld, TIA 10 yrs ago\par Meds: Xarelto 20 mg daily, Lisinopril, Atorvastatin, Metoprolol [de-identified] : pt here for bilateral unna boot \par s/p right AGSV varithena on 1/9/23, right distal GSV venaseal 1/30/23, right distal gsv tributary veins using varithena 2/6/23, left agsv varithena on 2/13/23 and left distal GSV varithena on 2/27/23\par pt doing well overall\par slight discomfort by the wounds\par ulcers on left medial malleolus healing well\par ulcers on right medial malleolus healed\par pt reports minimal drainage coming from bilateral medial malleolus wound\par bilateral leg swelling persists\par pt elevates her legs every day\par no new wounds/ulcers\par pt is getting pneumatic compression pump through [facility]

## 2023-03-21 NOTE — ASSESSMENT
[FreeTextEntry1] : Impression - venous stasis ulcers, chronic venous insufficiency s/p multiple vein procedures\par \par \par Plan\par Conservative medical management - leg elevation, exercise regimen, skin hygiene, weight loss, diet control, knee high compression stockings 30-40 mm hg after ulcers are healed from unna boot, and pneumatic compression device\par Bilateral lower extremity venous doppler at the end of the month\par Return to office next Wednesday to evaluate bilateral lower extremities for unna boot\par  [Arterial/Venous Disease] : arterial/venous disease [Ulcer Care] : ulcer care

## 2023-03-21 NOTE — ADDENDUM
[FreeTextEntry1] : After a brief demonstration on 3/19/2023 of the basic pump (entre  for 20 minutes set at 30mmHg), the patient was unable to tolerate the pressure due to increased pain in the lower extremities and showed an increase in proximal/truncal lymphedema and it needed to be removed. Patient also attempted self MLD without any improvement of symptoms. Therefore, I am strongly recommending the advanced pump, Flexitouch, to treat this patient both distally and proximally. Early signs of hyperpigmentation are noted.

## 2023-03-29 ENCOUNTER — APPOINTMENT (OUTPATIENT)
Dept: VASCULAR SURGERY | Facility: CLINIC | Age: 63
End: 2023-03-29
Payer: COMMERCIAL

## 2023-03-29 VITALS
WEIGHT: 293 LBS | HEIGHT: 70 IN | HEART RATE: 76 BPM | DIASTOLIC BLOOD PRESSURE: 97 MMHG | BODY MASS INDEX: 41.95 KG/M2 | SYSTOLIC BLOOD PRESSURE: 163 MMHG | TEMPERATURE: 98 F

## 2023-03-29 VITALS — SYSTOLIC BLOOD PRESSURE: 143 MMHG | DIASTOLIC BLOOD PRESSURE: 89 MMHG | HEART RATE: 79 BPM

## 2023-03-29 PROCEDURE — 99213 OFFICE O/P EST LOW 20 MIN: CPT | Mod: 25

## 2023-03-29 PROCEDURE — 29580 STRAPPING UNNA BOOT: CPT | Mod: 50

## 2023-03-29 NOTE — PROCEDURE
Called pt. Pt received #90 pills. This is enough for pt to take the prescribed dose for a month but then will need to be changed. Advised pt that rx will be changed to 50mg dosing in order for the number of tablets to be decreased and not exceed insurance limits. Pt will call writer when she is due for a refill to have change in dose sent to pharmacy.    [FreeTextEntry1] : Bilateral unna boot applied from toes to knee  w/o complications\par Xtrasorb applied to left medial malleolus wound\par Pt misa procedure well\par Pt is not allergic to the unna boot\par

## 2023-03-29 NOTE — PHYSICAL EXAM
[2+] : right 2+ [1+] : left 1+ [Ankle Swelling (On Exam)] : present [Ankle Swelling Bilaterally] : bilaterally  [Ankle Swelling On The Right] : mild [Varicose Veins Of Lower Extremities] : bilaterally [] : bilaterally [Ankle Swelling On The Left] : moderate [Skin Ulcer] : ulcer [Alert] : alert [Oriented to Person] : oriented to person [Oriented to Place] : oriented to place [Oriented to Time] : oriented to time [Calm] : calm [de-identified] : well in NAD  [de-identified] : ARLEENL [de-identified] : no resp distress [FreeTextEntry1] : LE vein findings:\par Varicosities (spider, reticular, varicose)  bilateral\par Edema bilateral\par Skin changes dry, flaky skin, stasis dermatitis and hyperpigmentation in the gator area\par Bilateral malleoli wounds healing well\par left medial malleolus skin reddened and raw\par right medial malleolus wounds scabbed over \par CEAP classification C6\par Palpable DP pulses bilaterally\par  [de-identified] : ARLEENL [de-identified] : bilateral medial malleolus ulcer  [de-identified] : bo cranial nerves 2-12 bo grossly intact [de-identified] : Cooperative

## 2023-03-29 NOTE — HISTORY OF PRESENT ILLNESS
[FreeTextEntry1] : Mrs. Figueroa, 62 yr old female, with hx of chronic venous insufficiency and venous stasis ulcers s/p bilateral GSV RFA and R SSV RFA in 2018 and 2019 c/o of bilateral medial ankle wounds that started about 2 weeks ago. Pt denies injuries or broken bones. Pt applied bilateral unna boot herself when she noticed increased hyperpigmentation around her medial ankles. 2-3 days after initiating bilateral unna boot, the patient noticed the skin opened up in both medial ankles. Pt is also c/o of wound on left 3rd toe that started about a month ago. She denies any injuries to the site. She has been applying hydrogen peroxide and antibiotic ointment to the wound.\par \par Pt denies claudication or rest pain. Pt works as a  and stands on her feet for about 10 hrs a day. She wears knee high 20-30 compression stockings daily for the past 3 months, instead of 30-40 mm hg as previously recommended. She elevates her legs at home at the end of the day.\par \par PMH: afib, chronic venous insufficiency, left ankle fracture, htn, hld, TIA 10 yrs ago\par Meds: Xarelto 20 mg daily, Lisinopril, Atorvastatin, Metoprolol [de-identified] : pt here for bilateral unna boot \par s/p right AGSV varithena on 1/9/23, right distal GSV venaseal 1/30/23, right distal gsv tributary veins using varithena 2/6/23, left agsv varithena on 2/13/23 and left distal GSV varithena on 2/27/23\par pt doing well overall\par slight discomfort by the wounds\par ulcers on left medial malleolus healing well\par ulcers on right medial malleolus healed\par pt reports light yellow drainage coming from left medial malleolus wound for the past couple of days\par bilateral leg swelling persists\par pt elevates her legs every day\par no new wounds/ulcers\par pt has received pneumatic compression pump through [facility]

## 2023-03-29 NOTE — ASSESSMENT
[FreeTextEntry1] : Impression - venous stasis ulcers, chronic venous insufficiency s/p multiple vein procedures\par \par \par Plan\par Conservative medical management - leg elevation, exercise regimen, skin hygiene, weight loss, diet control, knee high compression stockings 30-40 mm hg after ulcers are healed from unna boot, and pneumatic compression device\par Advised pt to use pneumatic compression device daily as instructed\par If wounds are not healing, may order VLE next office visit\par Return to office next Wednesday to evaluate bilateral lower extremities for unna boot\par \par  [Arterial/Venous Disease] : arterial/venous disease [Ulcer Care] : ulcer care

## 2023-04-05 ENCOUNTER — APPOINTMENT (OUTPATIENT)
Dept: VASCULAR SURGERY | Facility: CLINIC | Age: 63
End: 2023-04-05
Payer: COMMERCIAL

## 2023-04-05 VITALS — HEART RATE: 106 BPM | DIASTOLIC BLOOD PRESSURE: 88 MMHG | SYSTOLIC BLOOD PRESSURE: 128 MMHG

## 2023-04-05 VITALS
HEIGHT: 70 IN | DIASTOLIC BLOOD PRESSURE: 84 MMHG | HEART RATE: 87 BPM | SYSTOLIC BLOOD PRESSURE: 126 MMHG | WEIGHT: 293 LBS | BODY MASS INDEX: 41.95 KG/M2 | TEMPERATURE: 97.8 F

## 2023-04-05 PROCEDURE — 99212 OFFICE O/P EST SF 10 MIN: CPT | Mod: 25

## 2023-04-05 PROCEDURE — 29580 STRAPPING UNNA BOOT: CPT | Mod: 50

## 2023-04-06 NOTE — PHYSICAL EXAM
[2+] : right 2+ [1+] : left 1+ [Ankle Swelling (On Exam)] : present [Ankle Swelling Bilaterally] : bilaterally  [Ankle Swelling On The Right] : mild [Varicose Veins Of Lower Extremities] : bilaterally [] : bilaterally [Ankle Swelling On The Left] : moderate [Skin Ulcer] : ulcer [Alert] : alert [Oriented to Person] : oriented to person [Oriented to Place] : oriented to place [Oriented to Time] : oriented to time [Calm] : calm [de-identified] : well in NAD  [de-identified] : ARLEENL [de-identified] : no resp distress [FreeTextEntry1] : LE vein findings:\par Varicosities (spider, reticular, varicose)  bilateral\par Edema bilateral\par Skin changes dry, flaky skin, stasis dermatitis and hyperpigmentation in the gator area\par Bilateral malleoli wounds healing well\par left medial malleolus skin reddened and raw\par right medial malleolus wounds scabbed over. surround skin reddened \par CEAP classification C6\par Palpable DP pulses bilaterally\par  [de-identified] : ARLEENL [de-identified] : bilateral medial malleolus ulcer healing well [de-identified] : bo cranial nerves 2-12 bo grossly intact [de-identified] : Cooperative

## 2023-04-06 NOTE — HISTORY OF PRESENT ILLNESS
[FreeTextEntry1] : Mrs. Figueroa, 62 yr old female, with hx of chronic venous insufficiency and venous stasis ulcers s/p bilateral GSV RFA and R SSV RFA in 2018 and 2019 c/o of bilateral medial ankle wounds that started about 2 weeks ago. Pt denies injuries or broken bones. Pt applied bilateral unna boot herself when she noticed increased hyperpigmentation around her medial ankles. 2-3 days after initiating bilateral unna boot, the patient noticed the skin opened up in both medial ankles. Pt is also c/o of wound on left 3rd toe that started about a month ago. She denies any injuries to the site. She has been applying hydrogen peroxide and antibiotic ointment to the wound.\par \par Pt denies claudication or rest pain. Pt works as a  and stands on her feet for about 10 hrs a day. She wears knee high 20-30 compression stockings daily for the past 3 months, instead of 30-40 mm hg as previously recommended. She elevates her legs at home at the end of the day.\par \par PMH: afib, chronic venous insufficiency, left ankle fracture, htn, hld, TIA 10 yrs ago\par Meds: Xarelto 20 mg daily, Lisinopril, Atorvastatin, Metoprolol [de-identified] : pt here for bilateral unna boot \par s/p right AGSV varithena on 1/9/23, right distal GSV venaseal 1/30/23, right distal gsv tributary veins using varithena 2/6/23, left agsv varithena on 2/13/23 and left distal GSV varithena on 2/27/23\par pt doing well overall\par slight discomfort by the wounds\par ulcers on left medial malleolus healing well\par ulcers on right medial malleolus healed, but the skin is still raw-looking\par pt reports light yellow drainage coming from left medial malleolus wound for the past couple of days\par drainage has improved\par bilateral leg swelling improving\par pt states she received her Tactile lymphedema pump. pt has been using the pump daily\par pt elevates her legs every day\par no new wounds/ulcers\par

## 2023-04-06 NOTE — ASSESSMENT
[FreeTextEntry1] : Impression - venous stasis ulcers, chronic venous insufficiency s/p multiple vein procedures\par \par \par Plan\par Conservative medical management - leg elevation, exercise regimen, skin hygiene, weight loss, diet control, knee high compression stockings 30-40 mm hg after ulcers are healed, and pneumatic compression device\par Advised pt to use pneumatic compression device daily as instructed\par Return to office next Wednesday to evaluate bilateral lower extremities for unna boot\par \par  [Arterial/Venous Disease] : arterial/venous disease [Foot care/Footwear] : foot care/footwear [Ulcer Care] : ulcer care

## 2023-04-06 NOTE — PROCEDURE
[FreeTextEntry1] : Bilateral unna boot applied from toes to knee  w/o complications\par Xtrasorb applied to bilateral medial malleolus wound\par Pt misa procedure well\par Pt is not allergic to the unna boot\par

## 2023-04-19 ENCOUNTER — APPOINTMENT (OUTPATIENT)
Dept: VASCULAR SURGERY | Facility: CLINIC | Age: 63
End: 2023-04-19
Payer: COMMERCIAL

## 2023-04-19 VITALS
DIASTOLIC BLOOD PRESSURE: 80 MMHG | SYSTOLIC BLOOD PRESSURE: 118 MMHG | TEMPERATURE: 97.8 F | HEART RATE: 96 BPM | WEIGHT: 293 LBS | BODY MASS INDEX: 41.95 KG/M2 | HEIGHT: 70 IN

## 2023-04-19 VITALS — DIASTOLIC BLOOD PRESSURE: 80 MMHG | SYSTOLIC BLOOD PRESSURE: 115 MMHG | HEART RATE: 87 BPM

## 2023-04-19 PROCEDURE — 29580 STRAPPING UNNA BOOT: CPT | Mod: 50

## 2023-04-19 PROCEDURE — 99212 OFFICE O/P EST SF 10 MIN: CPT | Mod: 25

## 2023-04-19 NOTE — HISTORY OF PRESENT ILLNESS
[FreeTextEntry1] : Mrs. Figueroa, 62 yr old female, with hx of chronic venous insufficiency and venous stasis ulcers s/p bilateral GSV RFA and R SSV RFA in 2018 and 2019 c/o of bilateral medial ankle wounds that started about 2 weeks ago. Pt denies injuries or broken bones. Pt applied bilateral unna boot herself when she noticed increased hyperpigmentation around her medial ankles. 2-3 days after initiating bilateral unna boot, the patient noticed the skin opened up in both medial ankles. Pt is also c/o of wound on left 3rd toe that started about a month ago. She denies any injuries to the site. She has been applying hydrogen peroxide and antibiotic ointment to the wound.\par \par Pt denies claudication or rest pain. Pt works as a  and stands on her feet for about 10 hrs a day. She wears knee high 20-30 compression stockings daily for the past 3 months, instead of 30-40 mm hg as previously recommended. She elevates her legs at home at the end of the day.\par \par PMH: afib, chronic venous insufficiency, left ankle fracture, htn, hld, TIA 10 yrs ago\par Meds: Xarelto 20 mg daily, Lisinopril, Atorvastatin, Metoprolol [de-identified] : pt here for bilateral unna boot \par s/p right AGSV varithena on 1/9/23, right distal GSV venaseal 1/30/23, right distal gsv tributary veins using varithena 2/6/23, left agsv varithena on 2/13/23 and left distal GSV varithena on 2/27/23\par pt doing well overall\par wounds on left and right medial malleoli healing well\par skin still sensitive and red\par pt reports light yellow drainage coming from left medial malleolus wound \par bilateral leg swelling improving\par pt has been using the lymphedema pump 4-5 days during the week for 90 minutes each time\par pt elevates her legs every day\par no new wounds/ulcers\par

## 2023-04-19 NOTE — ASSESSMENT
[Arterial/Venous Disease] : arterial/venous disease [Foot care/Footwear] : foot care/footwear [Ulcer Care] : ulcer care [Other: _____] : [unfilled] [FreeTextEntry1] : Impression - venous stasis ulcers, chronic venous insufficiency s/p multiple vein procedures\par \par \par Plan\par Conservative medical management - leg elevation, exercise regimen, skin hygiene, weight loss, diet control, knee high compression stockings 30-40 mm hg after ulcers are healed, and pneumatic compression device\par Continue using pneumatic compression device daily as instructed\par Return to office in 2 weeks to evaluate bilateral lower extremities for unna boot\par \par

## 2023-04-19 NOTE — PHYSICAL EXAM
[2+] : right 2+ [1+] : left 1+ [Ankle Swelling (On Exam)] : present [Ankle Swelling Bilaterally] : bilaterally  [Ankle Swelling On The Right] : mild [Varicose Veins Of Lower Extremities] : bilaterally [] : bilaterally [Ankle Swelling On The Left] : moderate [Skin Ulcer] : ulcer [Alert] : alert [Oriented to Person] : oriented to person [Oriented to Place] : oriented to place [Oriented to Time] : oriented to time [Calm] : calm [de-identified] : well in NAD  [de-identified] : ARLEENL [de-identified] : no resp distress [FreeTextEntry1] : LE vein findings:\par Varicosities (spider, reticular, varicose)  bilateral\par Edema bilateral\par Skin changes dry, flaky skin, stasis dermatitis and hyperpigmentation in the gator area\par Bilateral malleoli wounds healing well\par redness improved in left medial malleolus \par right medial malleolus wounds scabbed over. surrounding skin reddened \par CEAP classification C6\par Palpable DP pulses bilaterally\par  [de-identified] : bilateral medial malleolus ulcer healing well [de-identified] : ARLEENL [de-identified] : bo cranial nerves 2-12 bo grossly intact [de-identified] : Cooperative

## 2023-05-10 ENCOUNTER — APPOINTMENT (OUTPATIENT)
Dept: VASCULAR SURGERY | Facility: CLINIC | Age: 63
End: 2023-05-10
Payer: COMMERCIAL

## 2023-05-10 ENCOUNTER — APPOINTMENT (OUTPATIENT)
Dept: VASCULAR SURGERY | Facility: CLINIC | Age: 63
End: 2023-05-10

## 2023-05-10 VITALS — HEART RATE: 84 BPM | SYSTOLIC BLOOD PRESSURE: 137 MMHG | DIASTOLIC BLOOD PRESSURE: 91 MMHG

## 2023-05-10 VITALS — SYSTOLIC BLOOD PRESSURE: 127 MMHG | DIASTOLIC BLOOD PRESSURE: 90 MMHG | TEMPERATURE: 98 F | HEART RATE: 120 BPM

## 2023-05-10 DIAGNOSIS — I89.0 LYMPHEDEMA, NOT ELSEWHERE CLASSIFIED: ICD-10-CM

## 2023-05-10 PROCEDURE — 29580 STRAPPING UNNA BOOT: CPT | Mod: 50

## 2023-05-10 PROCEDURE — 99212 OFFICE O/P EST SF 10 MIN: CPT | Mod: 25

## 2023-05-11 PROBLEM — I89.0 LYMPHEDEMA OF BOTH LOWER EXTREMITIES: Status: ACTIVE | Noted: 2023-03-06

## 2023-05-11 NOTE — PHYSICAL EXAM
[2+] : right 2+ [1+] : left 1+ [Ankle Swelling (On Exam)] : present [Ankle Swelling Bilaterally] : bilaterally  [Ankle Swelling On The Right] : mild [Varicose Veins Of Lower Extremities] : bilaterally [] : bilaterally [Ankle Swelling On The Left] : moderate [Skin Ulcer] : ulcer [Alert] : alert [Oriented to Person] : oriented to person [Oriented to Place] : oriented to place [Oriented to Time] : oriented to time [Calm] : calm [de-identified] : well in NAD  [de-identified] : ARLEENL [de-identified] : no resp distress [FreeTextEntry1] : LE vein findings:\par Varicosities (spider, reticular, varicose)  bilateral\par Edema bilateral\par Skin changes dry, flaky skin, stasis dermatitis and hyperpigmentation in the gator area\par Bilateral malleoli wounds reopened\par CEAP classification C6\par Palpable DP pulses bilaterally\par  [de-identified] : ARLEENL [de-identified] : bilateral medial malleolus ulcer healing well [de-identified] : bo cranial nerves 2-12 bo grossly intact [de-identified] : Cooperative

## 2023-05-11 NOTE — HISTORY OF PRESENT ILLNESS
[FreeTextEntry1] : Mrs. Figueroa, 62 yr old female, with hx of chronic venous insufficiency and venous stasis ulcers s/p bilateral GSV RFA and R SSV RFA in 2018 and 2019 c/o of bilateral medial ankle wounds that started about 2 weeks ago. Pt denies injuries or broken bones. Pt applied bilateral unna boot herself when she noticed increased hyperpigmentation around her medial ankles. 2-3 days after initiating bilateral unna boot, the patient noticed the skin opened up in both medial ankles. Pt is also c/o of wound on left 3rd toe that started about a month ago. She denies any injuries to the site. She has been applying hydrogen peroxide and antibiotic ointment to the wound.\par \par Pt denies claudication or rest pain. Pt works as a  and stands on her feet for about 10 hrs a day. She wears knee high 20-30 compression stockings daily for the past 3 months, instead of 30-40 mm hg as previously recommended. She elevates her legs at home at the end of the day.\par \par PMH: afib, chronic venous insufficiency, left ankle fracture, htn, hld, TIA 10 yrs ago\par Meds: Xarelto 20 mg daily, Lisinopril, Atorvastatin, Metoprolol [de-identified] : pt here for bilateral unna boot \par s/p right AGSV varithena on 1/9/23, right distal GSV venaseal 1/30/23, right distal gsv tributary veins using varithena 2/6/23, left agsv varithena on 2/13/23 and left distal GSV varithena on 2/27/23\par last office visit, wounds were almost healed\par today pt states she has been working a lot lately.\par she has been standing for long hrs without adequate rest breaks to elevate her legs\par bilateral medial malleoli wounds reopened and are very painful\par pt reports light yellow drainage coming from left medial malleolus wound \par pt has been applying bilateral unna boot herself 2x/week\par due to the pain by the wounds, she has not been using the lymphedema pump recently\par

## 2023-05-11 NOTE — ASSESSMENT
[Arterial/Venous Disease] : arterial/venous disease [Other: _____] : [unfilled] [Foot care/Footwear] : foot care/footwear [Ulcer Care] : ulcer care [FreeTextEntry1] : Impression - venous stasis ulcers, chronic venous insufficiency s/p multiple vein procedures\par \par \par Plan\par Conservative medical management - leg elevation, exercise regimen, skin hygiene, weight loss, diet control, knee high compression stockings 30-40 mm hg after ulcers are healed, and pneumatic compression device\par Continue using pneumatic compression device daily as instructed\par Return to office next Wednesday with bilateral VLE to evaluate bilateral lower extremities \par \par

## 2023-05-11 NOTE — PROCEDURE
[FreeTextEntry1] : Bilateral unna boot applied from toes to knee  w/o complications\par Adaptic and Xtrasorb applied to bilateral medial malleoli wound\par Pt misa procedure well\par Pt is not allergic to the unna boot\par  02-Oct-2018 23:18

## 2023-06-07 ENCOUNTER — APPOINTMENT (OUTPATIENT)
Dept: VASCULAR SURGERY | Facility: CLINIC | Age: 63
End: 2023-06-07
Payer: COMMERCIAL

## 2023-06-07 VITALS
HEIGHT: 70 IN | DIASTOLIC BLOOD PRESSURE: 82 MMHG | TEMPERATURE: 97.9 F | SYSTOLIC BLOOD PRESSURE: 149 MMHG | HEART RATE: 56 BPM | WEIGHT: 293 LBS | BODY MASS INDEX: 41.95 KG/M2

## 2023-06-07 PROCEDURE — 29580 STRAPPING UNNA BOOT: CPT | Mod: 50

## 2023-06-07 PROCEDURE — 99214 OFFICE O/P EST MOD 30 MIN: CPT | Mod: 25

## 2023-06-07 PROCEDURE — 93970 EXTREMITY STUDY: CPT

## 2023-06-07 RX ORDER — OXYCODONE AND ACETAMINOPHEN 5; 325 MG/1; MG/1
5-325 TABLET ORAL EVERY 6 HOURS
Qty: 28 | Refills: 0 | Status: ACTIVE | COMMUNITY
Start: 2023-06-07 | End: 1900-01-01

## 2023-06-08 NOTE — REVIEW OF SYSTEMS
[Lower Ext Edema] : lower extremity edema [Limb Swelling] : limb swelling [As Noted in HPI] : as noted in HPI [Skin Wound] : skin wound [Negative] : Heme/Lymph

## 2023-06-08 NOTE — PHYSICAL EXAM
[2+] : right 2+ [1+] : left 1+ [Ankle Swelling (On Exam)] : present [Ankle Swelling Bilaterally] : bilaterally  [Ankle Swelling On The Right] : mild [Varicose Veins Of Lower Extremities] : bilaterally [] : bilaterally [Ankle Swelling On The Left] : moderate [Skin Ulcer] : ulcer [Alert] : alert [Oriented to Person] : oriented to person [Oriented to Place] : oriented to place [Oriented to Time] : oriented to time [Calm] : calm [de-identified] : well in NAD  [de-identified] : ARLEENL [de-identified] : no resp distress [FreeTextEntry1] : LE vein findings:\par Varicosities (spider, reticular, varicose)  bilateral\par Edema bilateral\par Skin changes dry, flaky skin, stasis dermatitis and hyperpigmentation in the gator area\par Bilateral malleoli wounds healing well\par mild erythema by the wounds\par CEAP classification C6\par Palpable DP pulses bilaterally\par  [de-identified] : ARLEENL [de-identified] : bilateral medial malleolar ulcers healing well [de-identified] : bo cranial nerves 2-12 bo grossly intact [de-identified] : Cooperative

## 2023-06-08 NOTE — ASSESSMENT
[Arterial/Venous Disease] : arterial/venous disease [Other: _____] : [unfilled] [Foot care/Footwear] : foot care/footwear [Ulcer Care] : ulcer care [FreeTextEntry1] : Impression - venous stasis ulcers, chronic venous insufficiency s/p multiple vein procedures\par \par \par Plan\par Conservative medical management - leg elevation, exercise regimen, skin hygiene, weight loss, diet control, knee high compression stockings 30-40 mm hg after ulcers are healed, and pneumatic compression device\par Encouraged using pneumatic compression device daily as instructed\par Advised pt to wear bilateral unna boots, perform calf muscle exercises, and get up frequently during the flight\par Prescribed Percocet 5 mg prn for pain (rx sent)\par Educated pt to not exceed 4 gm of tylenol daily\par d/w pt indications, risks, and benefits of left distal GSV varithena (pictures taken)\par pt verbalizes understanding and agreement\par pt will call vein center to schedule an appointment for the procedure after coming back from her trip\par  \par \par

## 2023-06-08 NOTE — DATA REVIEWED
[FreeTextEntry1] : 12/16/20 Venous Doppler: Right leg ASV reflux but ASV is short and tortuous\par \par 12/14/22 bilateral VLE\par Right: neg dvt/svt; + reflux CFV, AGSV, GSV calf, distal calf,  and VV\par Left: neg dvt/svt + reflux CFV, AGSV, GSV calf, distal calf,  and VV\par \par 12/14/22 PVR/KARLOS\par Right: 1.34\par Left: 1.32\par \par 1/17/23 right leg venous doppler\par no acute dvt/ svt\par right AGSV partially ablated proximally post varithena ablation extends into tributary \par \par 3/6/2023 left leg venous doppler\par no acute dvt/svt\par patent gsv/tribs without closure s/p left distal gsv varithena\par \par 6/7/23 bilateral lower extremities venous doppler\par no acute dvt\par right: reflux recanalized AGSV\par left: reflux recanalized AGSV, residual thigh gsv and calf ssv

## 2023-06-08 NOTE — HISTORY OF PRESENT ILLNESS
[FreeTextEntry1] : Mrs. Figueroa, 62 yr old female, with hx of chronic venous insufficiency and venous stasis ulcers s/p bilateral GSV RFA and R SSV RFA in 2018 and 2019 c/o of bilateral medial ankle wounds that started about 2 weeks ago. Pt denies injuries or broken bones. Pt applied bilateral unna boot herself when she noticed increased hyperpigmentation around her medial ankles. 2-3 days after initiating bilateral unna boot, the patient noticed the skin opened up in both medial ankles. Pt is also c/o of wound on left 3rd toe that started about a month ago. She denies any injuries to the site. She has been applying hydrogen peroxide and antibiotic ointment to the wound.\par \par Pt denies claudication or rest pain. Pt works as a  and stands on her feet for about 10 hrs a day. She wears knee high 20-30 compression stockings daily for the past 3 months, instead of 30-40 mm hg as previously recommended. She elevates her legs at home at the end of the day.\par \par PMH: afib, chronic venous insufficiency, left ankle fracture, htn, hld, TIA 10 yrs ago\par Meds: Xarelto 20 mg daily, Lisinopril, Atorvastatin, Metoprolol [de-identified] : 6/7/23 pt here to evaluate bilateral medial malleoli wounds\par s/p right AGSV varithena on 1/9/23, right distal GSV venaseal 1/30/23, right distal gsv tributary veins using varithena 2/6/23, left agsv varithena on 2/13/23 and left distal GSV varithena on 2/27/23\par Wounds are healing well. Pt denies drainage\par Reports stinging sensation by the wounds\par pt has been taking tylenol for discomfort\par pt has been working a lot lately without adequate rest breaks to elevate her legs\par has been applying bilateral unna boot herself 2x/week\par due to discomfort, she has not been using the lymphedema pump recently\par pt states she will be travelling to [place] for 1 week\par

## 2023-07-14 ENCOUNTER — NON-APPOINTMENT (OUTPATIENT)
Age: 63
End: 2023-07-14

## 2023-07-26 ENCOUNTER — APPOINTMENT (OUTPATIENT)
Dept: VASCULAR SURGERY | Facility: CLINIC | Age: 63
End: 2023-07-26
Payer: COMMERCIAL

## 2023-07-26 VITALS
HEIGHT: 70 IN | DIASTOLIC BLOOD PRESSURE: 83 MMHG | WEIGHT: 293 LBS | HEART RATE: 94 BPM | BODY MASS INDEX: 41.95 KG/M2 | TEMPERATURE: 98.6 F | SYSTOLIC BLOOD PRESSURE: 138 MMHG

## 2023-07-26 PROCEDURE — 99213 OFFICE O/P EST LOW 20 MIN: CPT | Mod: 25

## 2023-07-26 PROCEDURE — 29580 STRAPPING UNNA BOOT: CPT | Mod: 50

## 2023-07-27 NOTE — HISTORY OF PRESENT ILLNESS
[FreeTextEntry1] : Mrs. Figueroa, 62 yr old female, with hx of chronic venous insufficiency and venous stasis ulcers s/p bilateral GSV RFA and R SSV RFA in 2018 and 2019 c/o of bilateral medial ankle wounds that started about 2 weeks ago. Pt denies injuries or broken bones. Pt applied bilateral unna boot herself when she noticed increased hyperpigmentation around her medial ankles. 2-3 days after initiating bilateral unna boot, the patient noticed the skin opened up in both medial ankles. Pt is also c/o of wound on left 3rd toe that started about a month ago. She denies any injuries to the site. She has been applying hydrogen peroxide and antibiotic ointment to the wound.\par \par Pt denies claudication or rest pain. Pt works as a  and stands on her feet for about 10 hrs a day. She wears knee high 20-30 compression stockings daily for the past 3 months, instead of 30-40 mm hg as previously recommended. She elevates her legs at home at the end of the day.\par \par PMH: afib, chronic venous insufficiency, left ankle fracture, htn, hld, TIA 10 yrs ago\par Meds: Xarelto 20 mg daily, Lisinopril, Atorvastatin, Metoprolol [de-identified] : 7/26/23 pt here to evaluate bilateral leg wounds\par s/p right AGSV varithena on 1/9/23, right distal GSV venaseal 1/30/23, right distal gsv tributary veins using varithena 2/6/23, left agsv varithena on 2/13/23 and left distal GSV varithena on 2/27/23\par since last office visit, pt developed new wounds on both legs\par wounds located on anterior/medial/posterior calf and ankle bilaterally\par states the wounds developed after coming back from her trip from [place]\par Reports stinging sensation by the wounds\par pt has been taking tylenol for discomfort\par pt has been working a lot lately without adequate rest breaks to elevate her legs\par has been applying bilateral unna boot herself 2x/week on her own\par due to leg discomfort from the wounds, she has not been using Flexitouch pump\par \par

## 2023-07-27 NOTE — PHYSICAL EXAM
[2+] : right 2+ [1+] : left 1+ [Ankle Swelling (On Exam)] : present [Ankle Swelling Bilaterally] : bilaterally  [Ankle Swelling On The Right] : mild [Varicose Veins Of Lower Extremities] : bilaterally [] : bilaterally [Ankle Swelling On The Left] : moderate [Skin Ulcer] : ulcer [Alert] : alert [Oriented to Person] : oriented to person [Oriented to Place] : oriented to place [Oriented to Time] : oriented to time [Calm] : calm [de-identified] : NAD  [de-identified] : NCAT [de-identified] : no resp distress [FreeTextEntry1] : LE vein findings:\par Varicosities (spider, reticular, varicose)  bilateral\par Edema bilateral\par Skin changes dry, flaky skin, stasis dermatitis and hyperpigmentation in the gator area\par new wounds on bilateral lower extremities \par RLE anterior shin, medial and posterior calf, medial ankle wounds\par LLE medial and posterior calf, medial ankle wounds\par CEAP classification C6\par Palpable DP pulses bilaterally\par  [de-identified] : ARLEENL [de-identified] : bilateral lower extremities wounds [de-identified] : bo cranial nerves 2-12 bo grossly intact [de-identified] : Cooperative

## 2023-07-27 NOTE — PROCEDURE
[FreeTextEntry1] : Bilateral unna boot with adaptic applied from toes to knee  w/o complications\par Pt misa procedure well\par Pt is not allergic to the unna boot\par

## 2023-07-27 NOTE — ASSESSMENT
[Arterial/Venous Disease] : arterial/venous disease [Other: _____] : [unfilled] [Foot care/Footwear] : foot care/footwear [Ulcer Care] : ulcer care [FreeTextEntry1] : Impression - recurrent venous stasis ulcers, chronic venous insufficiency s/p multiple vein procedures\par \par \par Plan\par Conservative medical management - leg elevation, exercise regimen, skin hygiene, weight loss, diet control, knee high compression stockings 30-40 mm hg after ulcers are healed, and pneumatic compression device\par Encouraged using pneumatic compression device daily as instructed. Premedicate with Tylenol before using Flexitouch for pain control\par Bilateral leg unna boot applied in office\par Educated pt not to exceed 4 gm of tylenol daily\par d/w pt indications, risks, and benefits of left distal GSV varithena (pictures taken)\par pt undecided if she wants to proceed with left distal GSV varithena\par return to office next week to bilateral unna boot change\par  \par \par

## 2023-08-02 ENCOUNTER — APPOINTMENT (OUTPATIENT)
Dept: VASCULAR SURGERY | Facility: CLINIC | Age: 63
End: 2023-08-02
Payer: COMMERCIAL

## 2023-08-02 VITALS — HEIGHT: 70 IN | WEIGHT: 293 LBS | BODY MASS INDEX: 41.95 KG/M2

## 2023-08-02 VITALS — HEART RATE: 80 BPM | SYSTOLIC BLOOD PRESSURE: 138 MMHG | TEMPERATURE: 97.5 F | DIASTOLIC BLOOD PRESSURE: 84 MMHG

## 2023-08-02 PROCEDURE — 29580 STRAPPING UNNA BOOT: CPT | Mod: 50

## 2023-08-02 NOTE — ASSESSMENT
[FreeTextEntry1] : 62F CVI C6 b/l LE ulcers Unna boot Changed every week (applied in the office today) Well-healing Will reapply dressing and change next week f/u 1 week with FREYA Mcknight Cece

## 2023-08-02 NOTE — HISTORY OF PRESENT ILLNESS
[FreeTextEntry1] : 62F with CVI CEAP C6 - weekly Unna boot application Improved from last week. R ant shin wound 3x2 and 2x1 cm, left are sub-centimeter Pt denies claudication or rest pain. Pt works as a  and stands on her feet for about 10 hrs a day. She wears knee high 20-30 compression stockings daily for the past 3 months, instead of 30-40 mm hg as previously recommended. She elevates her legs at home at the end of the day.

## 2023-08-02 NOTE — PHYSICAL EXAM
[Respiratory Effort] : normal respiratory effort [2+] : left 2+ [Ankle Swelling Bilaterally] : bilaterally  [Ankle Swelling On The Left] : moderate [JVD] : no jugular venous distention  [Ankle Swelling (On Exam)] : not present [] : not present [de-identified] : NAD [de-identified] : Normal [de-identified] : Normal rhythm [de-identified] : Right side 3x2 cm ulcer ant shin with clean base, 2x1 cm ant shin ulcer clean base, multiple small subcentimer lesions on left side, no purulence

## 2023-08-09 ENCOUNTER — APPOINTMENT (OUTPATIENT)
Dept: VASCULAR SURGERY | Facility: CLINIC | Age: 63
End: 2023-08-09
Payer: COMMERCIAL

## 2023-08-09 VITALS — SYSTOLIC BLOOD PRESSURE: 125 MMHG | DIASTOLIC BLOOD PRESSURE: 83 MMHG | HEART RATE: 108 BPM

## 2023-08-09 VITALS
BODY MASS INDEX: 41.95 KG/M2 | TEMPERATURE: 97.7 F | HEART RATE: 97 BPM | DIASTOLIC BLOOD PRESSURE: 82 MMHG | SYSTOLIC BLOOD PRESSURE: 134 MMHG | HEIGHT: 70 IN | WEIGHT: 293 LBS

## 2023-08-09 PROCEDURE — 29580 STRAPPING UNNA BOOT: CPT | Mod: 50

## 2023-08-09 PROCEDURE — 99212 OFFICE O/P EST SF 10 MIN: CPT | Mod: 25

## 2023-08-10 NOTE — HISTORY OF PRESENT ILLNESS
[FreeTextEntry1] : Mrs. Figueroa, 62 yr old female, with hx of chronic venous insufficiency and venous stasis ulcers s/p bilateral GSV RFA and R SSV RFA in 2018 and 2019 c/o bilateral medial ankle wounds that started about 2 weeks ago. Pt denies injuries or broken bones. Pt applied bilateral unna boot herself when she noticed increased hyperpigmentation around her medial ankles. 2-3 days after initiating bilateral unna boot, the patient noticed the skin opened up in both medial ankles. Pt is also c/o of wound on left 3rd toe that started about a month ago. She denies any injuries to the site. She has been applying hydrogen peroxide and antibiotic ointment to the wound.  Pt denies claudication or rest pain. Pt works as a  and stands on her feet for about 10 hrs a day. She wears knee high 20-30 compression stockings daily for the past 3 months, instead of 30-40 mm hg as previously recommended. She elevates her legs at home at the end of the day.  PMH: afib, chronic venous insufficiency, left ankle fracture, htn, hld, TIA 10 yrs ago Meds: Xarelto 20 mg daily, Lisinopril, Atorvastatin, Metoprolol [de-identified] : 8/9/23 pt here to evaluate bilateral leg wounds and for unna boot change s/p right AGSV varithena on 1/9/23, right distal GSV venaseal 1/30/23, right distal gsv tributary veins using varithena 2/6/23, left agsv varithena on 2/13/23 and left distal GSV varithena on 2/27/23 multiple bilateral leg wounds healing well and appear to be more shallow no more drainage still reports wound discomfort and is taking tylenol for it pt has been working a lot lately without adequate rest breaks to elevate her legs she has been elevating her legs at the end of the day and using the Flexitouch pump pt noticed after using the Flexitouch pump, her wounds did not drain as much

## 2023-08-10 NOTE — ASSESSMENT
[Arterial/Venous Disease] : arterial/venous disease [Other: _____] : [unfilled] [Foot care/Footwear] : foot care/footwear [Ulcer Care] : ulcer care [FreeTextEntry1] : Impression - recurrent venous stasis ulcers, chronic venous insufficiency s/p multiple vein procedures   Plan Conservative medical management - leg elevation, exercise regimen, skin hygiene, weight loss, diet control, knee high compression stockings 30-40 mm hg after ulcers are healed, and flexitouch pump Encouraged using flexitouch daily as instructed. Premedicate with Tylenol before using Flexitouch for pain control Bilateral leg unna boot applied in office Educated pt not to exceed 4 gm of tylenol daily return to office next wednesday to bilateral unna boot change

## 2023-08-10 NOTE — PHYSICAL EXAM
[2+] : right 2+ [1+] : left 1+ [Ankle Swelling (On Exam)] : present [Ankle Swelling Bilaterally] : bilaterally  [Ankle Swelling On The Right] : mild [Varicose Veins Of Lower Extremities] : bilaterally [] : bilaterally [Ankle Swelling On The Left] : moderate [Skin Ulcer] : ulcer [Alert] : alert [Oriented to Person] : oriented to person [Oriented to Place] : oriented to place [Oriented to Time] : oriented to time [Calm] : calm [de-identified] : NAD  [de-identified] : NCAT [de-identified] : no resp distress [FreeTextEntry1] : LE vein findings: Varicosities (spider, reticular, varicose)  bilateral Edema bilateral Skin changes dry, flaky skin, stasis dermatitis and hyperpigmentation in the gator area new wounds on bilateral lower extremities  RLE anterior shin, medial and posterior calf, medial ankle wounds LLE medial and posterior calf, medial ankle wounds CEAP classification C6 Palpable DP pulses bilaterally  [de-identified] : ARLEENL [de-identified] : bilateral lower extremities wounds [de-identified] : bo cranial nerves 2-12 bo grossly intact [de-identified] : Cooperative

## 2023-08-10 NOTE — PROCEDURE
[FreeTextEntry1] : Bilateral unna boot with santyl and adaptic applied from toes to knee  w/o complications Pt misa procedure well Pt is not allergic to the unna boot

## 2023-08-16 ENCOUNTER — APPOINTMENT (OUTPATIENT)
Dept: VASCULAR SURGERY | Facility: CLINIC | Age: 63
End: 2023-08-16
Payer: COMMERCIAL

## 2023-08-16 VITALS — HEART RATE: 108 BPM | SYSTOLIC BLOOD PRESSURE: 133 MMHG | DIASTOLIC BLOOD PRESSURE: 88 MMHG

## 2023-08-16 VITALS
DIASTOLIC BLOOD PRESSURE: 83 MMHG | TEMPERATURE: 97.9 F | WEIGHT: 293 LBS | SYSTOLIC BLOOD PRESSURE: 130 MMHG | HEIGHT: 70 IN | HEART RATE: 101 BPM | BODY MASS INDEX: 41.95 KG/M2

## 2023-08-16 PROCEDURE — 99212 OFFICE O/P EST SF 10 MIN: CPT | Mod: 25

## 2023-08-16 PROCEDURE — 29580 STRAPPING UNNA BOOT: CPT | Mod: 50

## 2023-08-16 NOTE — PHYSICAL EXAM
[2+] : right 2+ [1+] : left 1+ [Ankle Swelling (On Exam)] : present [Ankle Swelling Bilaterally] : bilaterally  [Ankle Swelling On The Right] : mild [Varicose Veins Of Lower Extremities] : bilaterally [] : bilaterally [Ankle Swelling On The Left] : moderate [Skin Ulcer] : ulcer [Alert] : alert [Oriented to Person] : oriented to person [Oriented to Place] : oriented to place [Oriented to Time] : oriented to time [Calm] : calm [de-identified] : NAD  [de-identified] : NCAT [de-identified] : no resp distress [FreeTextEntry1] : LE vein findings: Varicosities (spider, reticular, varicose)  bilateral Edema bilateral Skin changes dry, flaky skin, stasis dermatitis and hyperpigmentation in the gator area new wounds on bilateral lower extremities  RLE anterior shin wounds x 2 LLE medial and posterior calf, medial ankle wounds CEAP classification C6 Palpable DP pulses bilaterally  [de-identified] : ARLEENL [de-identified] : bilateral lower extremities wounds [de-identified] : bo cranial nerves 2-12 bo grossly intact [de-identified] : Cooperative

## 2023-08-16 NOTE — ASSESSMENT
[FreeTextEntry1] : Impression - recurrent venous stasis ulcers, chronic venous insufficiency s/p multiple vein procedures   Plan Conservative medical management - leg elevation, exercise regimen, skin hygiene, weight loss, diet control, knee high compression stockings 30-40 mm hg after ulcers are healed, and flexitouch pump Encouraged using flexitouch daily as instructed. Premedicate with Tylenol before using Flexitouch for pain control Bilateral leg unna boot applied in office Educated pt not to exceed 4 gm of tylenol daily return to office next wednesday to bilateral unna boot change     [Arterial/Venous Disease] : arterial/venous disease [Other: _____] : [unfilled] [Foot care/Footwear] : foot care/footwear [Ulcer Care] : ulcer care

## 2023-08-23 ENCOUNTER — APPOINTMENT (OUTPATIENT)
Dept: VASCULAR SURGERY | Facility: CLINIC | Age: 63
End: 2023-08-23
Payer: COMMERCIAL

## 2023-08-23 VITALS
WEIGHT: 293 LBS | SYSTOLIC BLOOD PRESSURE: 135 MMHG | DIASTOLIC BLOOD PRESSURE: 83 MMHG | BODY MASS INDEX: 41.95 KG/M2 | TEMPERATURE: 97.6 F | HEIGHT: 70 IN | HEART RATE: 79 BPM

## 2023-08-23 VITALS — DIASTOLIC BLOOD PRESSURE: 84 MMHG | HEART RATE: 88 BPM | SYSTOLIC BLOOD PRESSURE: 123 MMHG

## 2023-08-23 PROCEDURE — 99212 OFFICE O/P EST SF 10 MIN: CPT | Mod: 25

## 2023-08-23 PROCEDURE — 29580 STRAPPING UNNA BOOT: CPT | Mod: 50

## 2023-08-24 NOTE — PROCEDURE
[phone]
[FreeTextEntry1] : Bilateral unna boot with adaptic applied from toes to knee  w/o complications Pt misa procedure well Pt is not allergic to the unna boot

## 2023-08-24 NOTE — ASSESSMENT
[FreeTextEntry1] : Impression - recurrent venous stasis ulcers, chronic venous insufficiency s/p multiple vein procedures   Plan Conservative medical management - leg elevation, exercise regimen, skin hygiene, weight loss, diet control, knee high compression stockings 30-40 mm hg after ulcers heal, and flexitouch pump Encouraged use of flexitouch daily as instructed.  Bilateral leg unna boot applied in office return to office next wednesday to bilateral unna boot change     [Arterial/Venous Disease] : arterial/venous disease [Other: _____] : [unfilled] [Foot care/Footwear] : foot care/footwear [Ulcer Care] : ulcer care

## 2023-08-24 NOTE — HISTORY OF PRESENT ILLNESS
[FreeTextEntry1] : Mrs. Figueroa, 62 yr old female, with hx of chronic venous insufficiency and venous stasis ulcers s/p bilateral GSV RFA and R SSV RFA in 2018 and 2019 c/o bilateral medial ankle wounds that started about 2 weeks ago. Pt denies injuries or broken bones. Pt applied bilateral unna boot herself when she noticed increased hyperpigmentation around her medial ankles. 2-3 days after initiating bilateral unna boot, the patient noticed the skin opened up in both medial ankles. Pt is also c/o of wound on left 3rd toe that started about a month ago. She denies any injuries to the site. She has been applying hydrogen peroxide and antibiotic ointment to the wound.  Pt denies claudication or rest pain. Pt works as a  and stands on her feet for about 10 hrs a day. She wears knee high 20-30 compression stockings daily for the past 3 months, instead of 30-40 mm hg as previously recommended. She elevates her legs at home at the end of the day.  PMH: afib, chronic venous insufficiency, left ankle fracture, htn, hld, TIA 10 yrs ago Meds: Xarelto 20 mg daily, Lisinopril, Atorvastatin, Metoprolol [de-identified] : 8/23/23 pt here to evaluate bilateral leg wounds and for unna boot change s/p right AGSV varithena on 1/9/23, right distal GSV venaseal 1/30/23, right distal gsv tributary veins using varithena 2/6/23, left agsv varithena on 2/13/23 and left distal GSV varithena on 2/27/23 multiple bilateral leg wounds healing well and appear to be more shallow discomfort in the legs is improving pt has been working a lot lately without adequate rest breaks to elevate her legs she has been elevating her legs at the end of the day and using the Flexitouch pump

## 2023-08-24 NOTE — PHYSICAL EXAM
[2+] : right 2+ [1+] : left 1+ [Ankle Swelling (On Exam)] : present [Ankle Swelling Bilaterally] : bilaterally  [Ankle Swelling On The Right] : mild [Varicose Veins Of Lower Extremities] : bilaterally [] : bilaterally [Ankle Swelling On The Left] : moderate [Skin Ulcer] : ulcer [Alert] : alert [Oriented to Person] : oriented to person [Oriented to Place] : oriented to place [Oriented to Time] : oriented to time [Calm] : calm [de-identified] : NAD  [de-identified] : NCAT [de-identified] : no resp distress [FreeTextEntry1] : LE vein findings: Varicosities (spider, reticular, varicose)  bilateral Edema bilateral Skin changes dry, flaky skin, stasis dermatitis and hyperpigmentation in the gator area new wounds on bilateral lower extremities  RLE anterior shin and medial ankle wounds LLE medial and posterior calf, medial ankle wounds CEAP classification C6 Palpable DP pulses bilaterally  [de-identified] : ARLEENL [de-identified] : bilateral lower extremities wounds [de-identified] : bo cranial nerves 2-12 bo grossly intact [de-identified] : Cooperative

## 2023-08-30 ENCOUNTER — APPOINTMENT (OUTPATIENT)
Dept: VASCULAR SURGERY | Facility: CLINIC | Age: 63
End: 2023-08-30
Payer: COMMERCIAL

## 2023-08-30 PROCEDURE — 99212 OFFICE O/P EST SF 10 MIN: CPT | Mod: 25

## 2023-08-30 PROCEDURE — 29580 STRAPPING UNNA BOOT: CPT | Mod: 50

## 2023-08-30 NOTE — PHYSICAL EXAM
[2+] : right 2+ [1+] : left 1+ [Ankle Swelling (On Exam)] : present [Ankle Swelling Bilaterally] : bilaterally  [Ankle Swelling On The Right] : mild [Varicose Veins Of Lower Extremities] : bilaterally [] : bilaterally [Ankle Swelling On The Left] : moderate [Skin Ulcer] : ulcer [Alert] : alert [Oriented to Person] : oriented to person [Oriented to Place] : oriented to place [Oriented to Time] : oriented to time [Calm] : calm [de-identified] : NAD  [de-identified] : no resp distress [de-identified] : NCAT [FreeTextEntry1] : LE vein findings: Varicosities (spider, reticular, varicose)  bilateral Edema bilateral Skin changes dry, flaky skin, stasis dermatitis and hyperpigmentation in the gator area RLE anterior shin and medial ankle wounds LLE medial and posterior calf, medial ankle wounds CEAP classification C6 Palpable DP pulses bilaterally no additional new wounds [de-identified] : ARLEENL [de-identified] : bilateral lower extremities wounds [de-identified] : bo cranial nerves 2-12 bo grossly intact [de-identified] : Cooperative

## 2023-08-30 NOTE — ASSESSMENT
[FreeTextEntry1] : Impression - recurrent venous stasis ulcers, chronic venous insufficiency s/p multiple vein procedures, wounds slow to heal   Plan Conservative medical management - leg elevation, exercise regimen, skin hygiene, weight loss, diet control, knee high compression stockings 30-40 mm hg after ulcers heal, and flexitouch pump Encouraged use of flexitouch daily as instructed.  Bilateral leg unna boot applied in office pt unable to come next week for bilateral unna boot change advised pt to have her friend who is a healthcare professional to rewrap the legs next week return to office in 2 weeks to bilateral unna boot change     [Arterial/Venous Disease] : arterial/venous disease [Other: _____] : [unfilled] [Foot care/Footwear] : foot care/footwear [Ulcer Care] : ulcer care

## 2023-08-30 NOTE — HISTORY OF PRESENT ILLNESS
[FreeTextEntry1] : Mrs. Figueroa, 62 yr old female, with hx of chronic venous insufficiency and venous stasis ulcers s/p bilateral GSV RFA and R SSV RFA in 2018 and 2019 c/o bilateral medial ankle wounds that started about 2 weeks ago. Pt denies injuries or broken bones. Pt applied bilateral unna boot herself when she noticed increased hyperpigmentation around her medial ankles. 2-3 days after initiating bilateral unna boot, the patient noticed the skin opened up in both medial ankles. Pt is also c/o of wound on left 3rd toe that started about a month ago. She denies any injuries to the site. She has been applying hydrogen peroxide and antibiotic ointment to the wound.  Pt denies claudication or rest pain. Pt works as a  and stands on her feet for about 10 hrs a day. She wears knee high 20-30 compression stockings daily for the past 3 months, instead of 30-40 mm hg as previously recommended. She elevates her legs at home at the end of the day.  PMH: afib, chronic venous insufficiency, left ankle fracture, htn, hld, TIA 10 yrs ago Meds: Xarelto 20 mg daily, Lisinopril, Atorvastatin, Metoprolol [de-identified] : 8/30/23 pt here to evaluate bilateral leg wounds and for unna boot change s/p right AGSV varithena on 1/9/23, right distal GSV venaseal 1/30/23, right distal gsv tributary veins using varithena 2/6/23, left agsv varithena on 2/13/23 and left distal GSV varithena on 2/27/23 multiple bilateral leg wounds healing well and appear to be more shallow discomfort in the legs is improving very minimal drainage no new wounds pt has been working a lot lately without adequate rest breaks to elevate her legs she has been elevating her legs at the end of the day and using the Flexitouch pump

## 2023-08-30 NOTE — DATA REVIEWED
SUBJECTIVE: The patient presents with a laceration of the left leg. The injury occurred today at home and the injury is described as using a putty knife and slipped cutting left leg. Injury happened an hour ago. Last Tdap was on 3/14/12.    Last tetanus booster was more than 4 years ago     OBJECTIVE: Exam of the left distal thigh laterally reveals 2 cm full thickness laceration that is linear. The wound does not contain debris or foreign body.     The wound is cleansed and anesthetized with 1 cc of 1% Xylocaine without epi. The wound is not irrigated.     The wound is closed with simple closure of the skin only with 4-0 vicryl. Total of 2 horizontal mattress skin sutures are placed.     The wound is dressed and the patient is given wound care instructions and suture removal is recommended in 10 days.    ASSESSMENT:  ED Diagnosis   1. Laceration of left lower extremity, initial encounter  REPR SUPERF WND BODY 2.5CM OR LESS    TD TOXOID ADSORBED PRES FREE VACC 7+ YRS (DECAVAC/TENIVAC)   2. Need for vaccination  TD TOXOID ADSORBED PRES FREE VACC 7+ YRS (DECAVAC/TENIVAC)       PLAN:  Orders Placed This Encounter   • REPR SUPERF WND BODY 2.5CM OR LESS   • Td Toxoid Adsorbed Pres Free Vacc, 7+ yrs (Tenivac/Decavac)       Neosporin/ bandage applied to the site. Tetanus updated in office. Encouraged keep covered. May leave open to air at night. Activity as tolerated. Follow-up in 10 days for suture removal. If increased signs and symptoms develops should be further evaluated.    Jorge Herndon MD     [FreeTextEntry1] : 12/16/20 Venous Doppler: Right leg ASV reflux but ASV is short and tortuous  12/14/22 bilateral VLE Right: neg dvt/svt; + reflux CFV, AGSV, GSV calf, distal calf,  and VV Left: neg dvt/svt + reflux CFV, AGSV, GSV calf, distal calf,  and VV  12/14/22 PVR/KARLOS Right: 1.34 Left: 1.32  1/17/23 right leg venous doppler no acute dvt/ svt right AGSV partially ablated proximally post varithena ablation extends into tributary   3/6/2023 left leg venous doppler no acute dvt/svt patent gsv/tribs without closure s/p left distal gsv varithena  6/7/23 bilateral lower extremities venous doppler no acute dvt right: reflux recanalized AGSV left: reflux recanalized AGSV, residual thigh gsv and calf ssv

## 2023-09-19 ENCOUNTER — APPOINTMENT (OUTPATIENT)
Dept: VASCULAR SURGERY | Facility: CLINIC | Age: 63
End: 2023-09-19

## 2023-09-20 ENCOUNTER — APPOINTMENT (OUTPATIENT)
Dept: VASCULAR SURGERY | Facility: CLINIC | Age: 63
End: 2023-09-20
Payer: COMMERCIAL

## 2023-09-20 VITALS
SYSTOLIC BLOOD PRESSURE: 120 MMHG | DIASTOLIC BLOOD PRESSURE: 80 MMHG | HEIGHT: 70 IN | WEIGHT: 293 LBS | HEART RATE: 77 BPM | TEMPERATURE: 97.6 F | BODY MASS INDEX: 41.95 KG/M2

## 2023-09-20 PROCEDURE — 99212 OFFICE O/P EST SF 10 MIN: CPT | Mod: 25

## 2023-09-20 PROCEDURE — 29580 STRAPPING UNNA BOOT: CPT | Mod: 50

## 2023-10-14 ENCOUNTER — EMERGENCY (EMERGENCY)
Facility: HOSPITAL | Age: 63
LOS: 1 days | Discharge: HOME | End: 2023-10-14
Attending: STUDENT IN AN ORGANIZED HEALTH CARE EDUCATION/TRAINING PROGRAM | Admitting: EMERGENCY MEDICINE
Payer: COMMERCIAL

## 2023-10-14 VITALS
DIASTOLIC BLOOD PRESSURE: 89 MMHG | RESPIRATION RATE: 18 BRPM | HEART RATE: 90 BPM | OXYGEN SATURATION: 100 % | SYSTOLIC BLOOD PRESSURE: 157 MMHG | TEMPERATURE: 99 F

## 2023-10-14 PROCEDURE — 99283 EMERGENCY DEPT VISIT LOW MDM: CPT

## 2023-10-14 NOTE — ED PROVIDER NOTE - PROGRESS NOTE DETAILS
FAWN Mckeon - patient reassessed, gauze/packing taken out ~45 min ago - has not rebled. Pt. sitting comfortably, states no pain or bleeding. Vital signs stable, stable for DC at this time with afrin and nasal saline. Return to ER precautions given.

## 2023-10-14 NOTE — ED PROVIDER NOTE - NSFOLLOWUPINSTRUCTIONS_ED_ALL_ED_FT
[facility] - ENT  Otolaryngology (ENT)  [address] [place] [address]  Phone: [phone]    Krystian Terrazas  OTOLARYNGOLOGY  [address], [address]  [place] [address]  Phone: [phone]  Fax: [phone]  Established Patient    Nosebleed     WHAT YOU NEED TO KNOW:    What do I need to know about a nosebleed? A nosebleed, or epistaxis, occurs when one or more of the blood vessels in your nose break. You may have dark or bright red blood from one or both nostrils. A nosebleed can be caused by any of the following:    Cold, dry air    Trauma from picking your nose or a direct blow to your nose    Abnormal nose structure, such as a deviated septum    Irritation or inflammation from a cold, respiratory infection, or allergies    An object stuck in your nose    Certain medicines, such as blood thinners  How is a nosebleed diagnosed?    A nasal exam may show blood clots or swelling. Your healthcare provider will use an instrument called a speculum to check the inside of your nose. This gently opens your nostrils so your healthcare provider can see what part of your nose is bleeding.    A nasal endoscopy is a deeper exam of the inside of your nose. Your healthcare provider uses a scope (thin, flexible tube with a light and camera on the end) to see further into your nose.  What first aid should I do for a nosebleed?    Sit up and lean forward. This will help prevent you from swallowing blood. Spit blood and saliva into a bowl.    Apply pressure to your nose. Use 2 fingers to pinch your nose shut for 10 to 15 minutes. This will help stop the bleeding.    Apply ice on the bridge of your nose to decrease swelling and bleeding. Use a cold pack or put crushed ice in a plastic bag. Cover it with a towel to protect your skin.    Pack your nose with a cotton ball, tissue, tampon, or gauze bandage to stop the bleeding.  How is a severe nosebleed treated? You may need any of the following if the bleeding does not stop after first aid is done:    Medicines may be applied to a small piece of cotton and placed in your nose. Medicine may also be sprayed in or applied directly to your nose. You may need medicine to prevent an infection. If bleeding is severe, medicine may be injected into a blood vessel in your nose.    Cautery is when a chemical or electric device is used to seal the blood vessels. This may be done to stop bleeding or prevent more bleeding. Local anesthesia may be used.    Nasal packing is when layers of gauze are placed in your nose to provide pressure and stop the bleeding. Local anesthesia may be used. Nasal packing is usually removed in 2 to 3 days.    Embolization is a procedure used to stop the bleeding from inside your nose. Medical glue or a small balloon device may be used to clog the blood vessels in your nose.    Surgery may be needed if your nosebleed returns over and over long-term. An artery may be tied to stop bleeding. Damaged tissue or an abnormal structure in your nose may be repaired.  How can I help prevent another nosebleed?    Keep your nose moist. Put a small amount of petroleum jelly inside your nostrils as needed. Use a saline (saltwater) nasal spray. Do not put anything else inside your nose unless your healthcare provider says it is okay. Do not use oil-based lubricants if you use oxygen therapy. They may be flammable.    Use a cool mist humidifier to increase air moisture in your home. This will help your nose stay moist.    Do not pick or blow your nose for at least a week. You can irritate or damage your nose if you pick it. Blowing your nose too hard may cause the bleeding to start again. Do not bend over or strain as this can cause the bleeding to start again.    Avoid irritants such as tobacco smoke or chemical sprays such as .  When should I seek immediate care?    Your nose is still bleeding after 20 minutes, even after you pinch it.    Your nasal packing is soaked with blood.    You have a foul-smelling discharge coming out of your nose.    You feel so weak and dizzy that you have trouble standing up.    You have trouble breathing or talking.  When should I contact my healthcare provider?    You have a fever and are vomiting.    You have pain in and around your nose.    Your nasal pack is loose.    You have questions or concerns about your condition or care.  CARE AGREEMENT:    You have the right to help plan your care. Learn about your health condition and how it may be treated. Discuss treatment options with your healthcare providers to decide what care you want to receive. You always have the right to refuse treatment.      EN ESPANOL     Translation results  [facility] - ENT  Otorrinolaringología (ENT)  [address] [place] [address]  Teléfono: [phone]    Krystian Terrazas A  OTOLARINGOLOGÍA  [address], [address]  [place] [address]  Teléfono: [phone]  Fax: [phone]  Paciente establecido     Hemorragia nasal    LO QUE NECESITA SABER:    ¿Qué necesito saber acerca de cora hemorragia nasal?Cora hemorragia nasal o epistaxis ocurre cuando jennifer o más de los vasos sanguíneos de coelho nariz se revientan. Usted podría tener sangrado donis oscuro o brillante en cora o ambas fosas nasales. Cora hemorragia nasal puede ser provocada por cualquiera de lo siguiente:    Frío, aire seco    Trauma por hurgarse coelho nariz o un golpe directo a coelho nariz    Estructura anormal de la nariz, sherita un tabique desviado    Irritación o inflamación debido a un resfriado, cora infección respiratoria o a alergias    Un objeto atorado en coelho nariz    Ciertos medicamentos, sherita los anticoagulantes  ¿Cómo se diagnostica cora hemorragia nasal?    Un examen nasalpodría mostrar coágulos de alejandro o inflamación. Coelho médico usará un instrumento que se conoce sherita espéculo para revisar el interior de coelho nariz. [place] abre brant fosas nasales cuidadosamente para que coelho médico pueda darian qué parte de coelho nariz está sangrando.    Cora endoscopia nasales un examen más profundo del interior de coelho nariz. Coelho médico usa un endoscopio (tubo joseph y flexible con cora tin y cámara en el extremo) para darian más dentro de coelho nariz.  ¿Cuáles son los primeros auxilios que debería realizar para cora hemorragia nasal?    Siéntese derecho e inclínese hacia adelante.[place] ayudará a evitar que usted se trague la alejandro. Escupa la alejandro y saliva en un contenedor.    Aplique presión en coelho nariz.Use 2 dedos para apretar y cerrar coelho nariz por 10 a 15 minutos. [place] ayudará a detener el sangrado.    Aplique hielosobre el sage nasal para disminuir la inflamación y el sangrado. Use un paquete con hielo o ponga hielo triturado en cora bolsa de plástico. Cúbrala con cora toalla para proteger coelho piel.    Tape coelho narizcon un copo de algodón, pañuelos desechables, tampón o un vendaje de gaza para detener el sangrado.  ¿Cómo se trata cora hemorragia nasal severa?Usted podría necesitar alguno de los siguientes si la hemorragia no se detiene después de walker realizado los primeros auxilios:    Los medicamentospodrían aplicarse a un pedazo pequeño de algodón y colocarse en coelho nariz. Los medicamentos también podrían ser rociados o aplicados directamente en coelho nariz. Es posible que usted necesite medicamento para evitar cora infección. Si la hemorragia es severa, se le podría inyectar el medicamento en un vaso sanguíneo de coelho nariz.    La cauterizaciónes cuando se usa un químico o un dispositivo eléctrico para sellar los vasos sanguíneos. [place] podría realizarse para detener la hemorragia o evitar más sangrado. Podría usarse anestesia local.    El taponamiento nasales cuando se colocan capas de gaza en coelho nariz para proporcionar presión y detener el sangrado. Podría usarse anestesia local. El taponamiento nasal usualmente se remueve dentro de 2 a 3 días.    La embolizaciónes un procedimiento que se usa para detener el sangrado del interior de coelho nariz. Podría usarse pegamento médico o un dispositivo con balón pequeño para obstruir los vasos sanguíneos en coelho nariz.    La cirugíapodría ser necesaria si coelho hemorragia nasal regresa cora y otra vez por mucho tiempo. Podrían atar cora arteria para detener el sangrado. Podrían reparar el tejido dañado o cora estructura anormal en coelho nariz.  ¿Cómo puedo ayudar a evitar otra hemorragia nasal?    Mantenga coelho nariz húmeda.Ponga cora pequeña cantidad de cora pomada de petrolato adentro de brant fosas nasales según sea necesario. Use cora spray nasal salino (agua con sal). No ponga nada más dentro de coelho nariz a menos que coelho médico lo autorice. No use un lubricante a base de aceite si está teniendo terapia de oxígeno. Podrían ser inflamables.    Éstos podrían ser inflamables.Use un humidificador de vapor frío en coelho hogar.[place] ayudará a que coelho nariz esté húmeda.    No se hurgue ni se suene la nariz por lo menos por cora semana.Usted puede irritar o dañar coelho nariz si se la hurga. Al sonar o soplar la nariz muy jeison podría provocar que comience a sangrar de nuevo. No se agache o se esfuerce porque esto puede provocarle que alejandro de nuevo.    Evite los irritantescomo el humo del cigarro o atomizadores de químicos sherita los limpiadores.  ¿Cuándo faustino buscar atención inmediata?    Coelho nariz todavía sangra después de 20 minutos, aún después de aplicarle presión.    Coelho tapón nasal está empapado de alejandro.    A usted le sale cora secreción de mal olor de coelho nariz.    Usted se siente tan débil y mareado que tiene dificultad para ponerse de pie.    Usted tiene dificultad para respirar o hablar.  ¿Cuándo faustino comunicarme con mi médico?    Usted tiene fiebre y está vomitando.    Usted tiene dolor en y alrededor de la nariz.    El tapón nasal está suelto.    Usted tiene preguntas o inquietudes acerca de coelho condición o cuidado.  ACUERDOS SOBRE COELHO CUIDADO:    Usted tiene el derecho de ayudar a planear coelho cuidado. Aprenda todo lo que pueda sobre coelho condición y sherita darle tratamiento. Discuta brant opciones de tratamiento con brant médicos para decidir el cuidado que usted desea recibir. Usted siempre tiene el derecho de rechazar el tratamiento.

## 2023-10-14 NOTE — ED ADULT NURSE NOTE - OBJECTIVE STATEMENT
Received patient in room 28 c/o epistaxis, PMHX AFIB on Xarelto. Patient denies SOB, chest pain, fever. Patient is A&OX4, airway patent, breathing unlabored and even. Side rails up and safety maintained. Fall precaution in place. Call bells within reach. Received patient in room 28 c/o epistaxis, PMHX AFIB on Xarelto, HTN, HLD. Patient denies SOB, chest pain, fever. Patient is A&OX4, airway patent, breathing unlabored and even. PA at bedside for evaluation. Side rails up and safety maintained. Fall precaution in place. Call bells within reach.

## 2023-10-14 NOTE — ED ADULT TRIAGE NOTE - CHIEF COMPLAINT QUOTE
Pt AOX4 c/o bloody nose, started spontaneously approx 1 hr ago, pt takes Xarelto for Afib  Hx of AFib, HTN

## 2023-10-14 NOTE — ED PROVIDER NOTE - PATIENT PORTAL LINK FT
You can access the FollowMyHealth Patient Portal offered by [facility] by registering at the following website: [email or web address]. By joining [facility]’s FollowMyHealth portal, you will also be able to view your health information using other applications (apps) compatible with our system.

## 2023-10-14 NOTE — ED PROVIDER NOTE - OBJECTIVE STATEMENT
63 y/o female hx afib on xarelto, htn hld presenting to ER c/o epistaxis. Pt. states at 930 this morning developed epistaxis - states think intiailly both nostrils but held pressure on right and left seemed to be worse. Tried to hold pressure at work for the past hour intermittently but states that it continued to bleed and also has blood coming down back of her throat as well. Pt. denies trauma to area. Denies hx nosebleeds in the past. Denies fever chills difficulty swallowing or breathing.

## 2023-10-14 NOTE — ED ADULT NURSE NOTE - NSICDXPASTSURGICALHX_GEN_ALL_CORE_FT
PAST SURGICAL HISTORY:  Ankle fracture, left s/p Surgery--2009--[facility]    Foot fracture, left s/p Surgery --2009--[facility]

## 2023-10-14 NOTE — ED PROVIDER NOTE - CLINICAL SUMMARY MEDICAL DECISION MAKING FREE TEXT BOX
61 y/o female hx afib on xarelto c/o epistaxis x 2 hours  -no trauma, likely persistent due to xarelto  -will start with afrin and compression and escalate aggression of treatment accordingly

## 2023-10-14 NOTE — ED PROVIDER NOTE - ATTENDING APP SHARED VISIT CONTRIBUTION OF CARE
62-year-old female, past medical history of A-fib on Xarelto, hypertension, hyperlipidemia, presents to ED complaining of epistaxis.  Patient reports developed sudden onset of epistaxis in 930 this morning (b/l nares).  Patient denies any falls or facial trauma.  Patient states this is never happened before.  Patient tried to hold manual pressure intermittently over the last hour without relief of bleeding.  Patient does note that she did feel blood in the back of her throat as well.  Patient denies headache, vision changes, lightheadedness/dizziness, weakness/numbness, nausea/vomiting, fever/chills, chest pain/shortness of breath or any other symptoms at this time.    Vital signs stable.  Physical exam significant for well-appearing patient in no acute distress.  Head is normocephalic/atraumatic.  Nose with oozing blood from the right nare.  No septal hematoma.  Posterior oropharynx with dried blood, but no erythema or exudates.  Heart is regular rate and rhythm without murmur.  Lungs clear to auscultation bilaterally.  Abdomen is soft and nontender.  Skin is warm and well-perfused.  Neuro exam is nonfocal.  Otherwise unremarkable physical exam.    Epistaxis likely secondary to patient being on Xarelto.  No obvious trauma.  Plan to start Afrin and manual compression.  Will escalate to Surgicel and/or Rhino Rocket as needed.  Will reassess.

## 2023-11-01 ENCOUNTER — APPOINTMENT (OUTPATIENT)
Dept: VASCULAR SURGERY | Facility: CLINIC | Age: 63
End: 2023-11-01
Payer: COMMERCIAL

## 2023-11-01 VITALS
SYSTOLIC BLOOD PRESSURE: 160 MMHG | BODY MASS INDEX: 41.95 KG/M2 | WEIGHT: 293 LBS | HEART RATE: 81 BPM | TEMPERATURE: 97.6 F | DIASTOLIC BLOOD PRESSURE: 86 MMHG | HEIGHT: 70 IN

## 2023-11-01 DIAGNOSIS — L97.909 VARICOSE VEINS OF UNSPECIFIED LOWER EXTREMITY WITH ULCER OF UNSPECIFIED SITE: ICD-10-CM

## 2023-11-01 DIAGNOSIS — I83.009 VARICOSE VEINS OF UNSPECIFIED LOWER EXTREMITY WITH ULCER OF UNSPECIFIED SITE: ICD-10-CM

## 2023-11-01 DIAGNOSIS — I87.2 VENOUS INSUFFICIENCY (CHRONIC) (PERIPHERAL): ICD-10-CM

## 2023-11-01 DIAGNOSIS — I83.893 VARICOSE VEINS OF BILATERAL LOWER EXTREMITIES WITH OTHER COMPLICATIONS: ICD-10-CM

## 2023-11-01 PROCEDURE — 29580 STRAPPING UNNA BOOT: CPT | Mod: 50

## 2023-11-01 PROCEDURE — 99212 OFFICE O/P EST SF 10 MIN: CPT | Mod: 25

## 2023-11-18 NOTE — H&P PST ADULT - NS NEC GEN PE MLT EXAM PC
Lauri Seymour  Cardiovascular Disease  [address] [address]  Phone: [phone]  Fax: [phone]  Established Patient  Follow Up Time: 2 weeks   No bruits; no thyromegaly or nodules

## 2025-07-24 NOTE — HISTORY OF PRESENT ILLNESS
Patient contacted, procedure scheduled, instructions reviewed and mailed to patient.    [FreeTextEntry1] : Mrs. Figueroa, 62 yr old female, with hx of chronic venous insufficiency and venous stasis ulcers s/p bilateral GSV RFA and R SSV RFA in 2018 and 2019 c/o bilateral medial ankle wounds that started about 2 weeks ago. Pt denies injuries or broken bones. Pt applied bilateral unna boot herself when she noticed increased hyperpigmentation around her medial ankles. 2-3 days after initiating bilateral unna boot, the patient noticed the skin opened up in both medial ankles. Pt is also c/o of wound on left 3rd toe that started about a month ago. She denies any injuries to the site. She has been applying hydrogen peroxide and antibiotic ointment to the wound.  Pt denies claudication or rest pain. Pt works as a  and stands on her feet for about 10 hrs a day. She wears knee high 20-30 compression stockings daily for the past 3 months, instead of 30-40 mm hg as previously recommended. She elevates her legs at home at the end of the day.  PMH: afib, chronic venous insufficiency, left ankle fracture, htn, hld, TIA 10 yrs ago Meds: Xarelto 20 mg daily, Lisinopril, Atorvastatin, Metoprolol [de-identified] : 8/16/23 pt here to evaluate bilateral leg wounds and for unna boot change s/p right AGSV varithena on 1/9/23, right distal GSV venaseal 1/30/23, right distal gsv tributary veins using varithena 2/6/23, left agsv varithena on 2/13/23 and left distal GSV varithena on 2/27/23 multiple bilateral leg wounds healing well and appear to be more shallow no more drainage discomfort in the legs is improving pt has been working a lot lately without adequate rest breaks to elevate her legs she has been elevating her legs at the end of the day and using the Flexitouch pump pt noticed after using the Flexitouch pump, her wounds did not drain as much